# Patient Record
Sex: FEMALE | Race: WHITE | Employment: FULL TIME | URBAN - NONMETROPOLITAN AREA
[De-identification: names, ages, dates, MRNs, and addresses within clinical notes are randomized per-mention and may not be internally consistent; named-entity substitution may affect disease eponyms.]

---

## 2018-12-26 ENCOUNTER — HOSPITAL ENCOUNTER (EMERGENCY)
Age: 23
Discharge: HOME | End: 2018-12-26
Payer: COMMERCIAL

## 2018-12-26 ENCOUNTER — APPOINTMENT (OUTPATIENT)
Dept: GENERAL RADIOLOGY | Age: 23
End: 2018-12-26
Payer: COMMERCIAL

## 2018-12-26 VITALS
WEIGHT: 110 LBS | RESPIRATION RATE: 16 BRPM | SYSTOLIC BLOOD PRESSURE: 144 MMHG | OXYGEN SATURATION: 100 % | TEMPERATURE: 97.4 F | HEART RATE: 99 BPM | DIASTOLIC BLOOD PRESSURE: 96 MMHG

## 2018-12-26 DIAGNOSIS — S60.021A CONTUSION OF RIGHT INDEX FINGER WITHOUT DAMAGE TO NAIL, INITIAL ENCOUNTER: Primary | ICD-10-CM

## 2018-12-26 PROCEDURE — 99283 EMERGENCY DEPT VISIT LOW MDM: CPT

## 2018-12-26 PROCEDURE — 73120 X-RAY EXAM OF HAND: CPT

## 2018-12-26 ASSESSMENT — ENCOUNTER SYMPTOMS
BLOOD IN STOOL: 0
COUGH: 0
NAUSEA: 0
DIARRHEA: 0
SORE THROAT: 0
CHEST TIGHTNESS: 0
EYE REDNESS: 0
SHORTNESS OF BREATH: 0
ABDOMINAL PAIN: 0
CONSTIPATION: 0
RHINORRHEA: 0
VOMITING: 0
EYE DISCHARGE: 0
WHEEZING: 0
BACK PAIN: 0

## 2018-12-26 ASSESSMENT — PAIN SCALES - GENERAL: PAINLEVEL_OUTOF10: 5

## 2018-12-26 ASSESSMENT — PAIN DESCRIPTION - LOCATION: LOCATION: FINGER (COMMENT WHICH ONE)

## 2018-12-26 ASSESSMENT — PAIN DESCRIPTION - PAIN TYPE: TYPE: ACUTE PAIN

## 2018-12-26 ASSESSMENT — PAIN DESCRIPTION - ORIENTATION: ORIENTATION: RIGHT

## 2018-12-27 NOTE — ED PROVIDER NOTES
Negative for dizziness, syncope, weakness and light-headedness. Hematological: Negative for adenopathy. Does not bruise/bleed easily. Psychiatric/Behavioral: Negative for behavioral problems and suicidal ideas. The patient is not nervous/anxious. Except as noted above the remainder of the review of systems was reviewed and negative. PAST MEDICAL HISTORY   History reviewed. No pertinent past medical history. SURGICALHISTORY     History reviewed. No pertinent surgical history.       CURRENT MEDICATIONS       Discharge Medication List as of 12/26/2018  7:02 PM      CONTINUE these medications which have NOT CHANGED    Details   sulfamethoxazole-trimethoprim (BACTRIM DS;SEPTRA DS) 800-160 MG per tablet Take 1 tablet by mouth 2 times daily      diphenhydrAMINE (BENADRYL) 25 MG capsule Take 25 mg by mouth every 6 hours as needed for Itching      ibuprofen (ADVIL;MOTRIN) 600 MG tablet Take 1 tablet by mouth every 6 hours as needed for Pain., Disp-30 tablet, R-0             ALLERGIES     Other    FAMILY HISTORY       Family History   Problem Relation Age of Onset    High Blood Pressure Maternal Grandmother     High Cholesterol Maternal Grandmother     Diabetes Maternal Grandfather     Stroke Maternal Grandfather     High Blood Pressure Paternal Grandmother     Cancer Paternal Grandfather           SOCIAL HISTORY       Social History     Social History    Marital status: Single     Spouse name: N/A    Number of children: N/A    Years of education: N/A     Social History Main Topics    Smoking status: Never Smoker    Smokeless tobacco: Never Used    Alcohol use No    Drug use: No    Sexual activity: Not Asked     Other Topics Concern    None     Social History Narrative    None       SCREENINGS      @FLOW([id number])@      PHYSICAL EXAM    (up to 7 for level 4, 8 or more for level 5)     ED Triage Vitals [12/26/18 1825]   BP Temp Temp Source Pulse Resp SpO2 Height Weight   (!) 144/96 97.4 °F

## 2019-01-23 ENCOUNTER — APPOINTMENT (OUTPATIENT)
Dept: CT IMAGING | Age: 24
End: 2019-01-23
Payer: COMMERCIAL

## 2019-01-23 ENCOUNTER — HOSPITAL ENCOUNTER (EMERGENCY)
Age: 24
Discharge: HOME | End: 2019-01-24
Attending: EMERGENCY MEDICINE
Payer: COMMERCIAL

## 2019-01-23 ENCOUNTER — APPOINTMENT (OUTPATIENT)
Dept: ULTRASOUND IMAGING | Age: 24
End: 2019-01-23
Payer: COMMERCIAL

## 2019-01-23 VITALS
HEIGHT: 64 IN | RESPIRATION RATE: 16 BRPM | TEMPERATURE: 100 F | WEIGHT: 114 LBS | HEART RATE: 88 BPM | BODY MASS INDEX: 19.46 KG/M2 | DIASTOLIC BLOOD PRESSURE: 69 MMHG | OXYGEN SATURATION: 99 % | SYSTOLIC BLOOD PRESSURE: 110 MMHG

## 2019-01-23 DIAGNOSIS — R50.81 FEVER IN OTHER DISEASES: ICD-10-CM

## 2019-01-23 DIAGNOSIS — N93.9 VAGINAL BLEEDING: Primary | ICD-10-CM

## 2019-01-23 LAB
-: ABNORMAL
ALBUMIN SERPL-MCNC: 4.4 G/DL (ref 3.5–5.2)
ALBUMIN/GLOBULIN RATIO: 1.5 (ref 1–2.5)
ALP BLD-CCNC: 76 U/L (ref 35–104)
ALT SERPL-CCNC: 5 U/L (ref 5–33)
AMORPHOUS: ABNORMAL
AMPHETAMINE SCREEN URINE: NEGATIVE
ANION GAP SERPL CALCULATED.3IONS-SCNC: 12 MMOL/L (ref 9–17)
AST SERPL-CCNC: 15 U/L
BACTERIA: ABNORMAL
BARBITURATE SCREEN URINE: NEGATIVE
BENZODIAZEPINE SCREEN, URINE: NEGATIVE
BILIRUB SERPL-MCNC: 0.43 MG/DL (ref 0.3–1.2)
BILIRUBIN URINE: NEGATIVE
BUN BLDV-MCNC: 12 MG/DL (ref 6–20)
BUN/CREAT BLD: 18 (ref 9–20)
BUPRENORPHINE URINE: NEGATIVE
CALCIUM SERPL-MCNC: 8.4 MG/DL (ref 8.6–10.4)
CANNABINOID SCREEN URINE: NEGATIVE
CASTS UA: ABNORMAL /LPF
CHLORIDE BLD-SCNC: 103 MMOL/L (ref 98–107)
CO2: 24 MMOL/L (ref 20–31)
COCAINE METABOLITE, URINE: NEGATIVE
COLOR: YELLOW
COMMENT UA: ABNORMAL
CREAT SERPL-MCNC: 0.68 MG/DL (ref 0.5–0.9)
CRYSTALS, UA: ABNORMAL /HPF
EPITHELIAL CELLS UA: ABNORMAL /HPF (ref 0–25)
GFR AFRICAN AMERICAN: >60 ML/MIN
GFR NON-AFRICAN AMERICAN: >60 ML/MIN
GFR SERPL CREATININE-BSD FRML MDRD: ABNORMAL ML/MIN/{1.73_M2}
GFR SERPL CREATININE-BSD FRML MDRD: ABNORMAL ML/MIN/{1.73_M2}
GLUCOSE BLD-MCNC: 107 MG/DL (ref 70–99)
GLUCOSE URINE: NEGATIVE
HCG QUANTITATIVE: <1 IU/L
HCG(URINE) PREGNANCY TEST: NEGATIVE
HCT VFR BLD CALC: 39.5 % (ref 36.3–47.1)
HEMOGLOBIN: 12.9 G/DL (ref 11.9–15.1)
KETONES, URINE: NEGATIVE
LACTIC ACID, WHOLE BLOOD: NORMAL MMOL/L (ref 0.7–2.1)
LACTIC ACID: 0.7 MMOL/L (ref 0.5–2.2)
LEUKOCYTE ESTERASE, URINE: NEGATIVE
MCH RBC QN AUTO: 29.3 PG (ref 25.2–33.5)
MCHC RBC AUTO-ENTMCNC: 32.7 G/DL (ref 28.4–34.8)
MCV RBC AUTO: 89.6 FL (ref 82.6–102.9)
MDMA URINE: NORMAL
METHADONE SCREEN, URINE: NEGATIVE
METHAMPHETAMINE, URINE: NEGATIVE
MUCUS: ABNORMAL
NITRITE, URINE: NEGATIVE
NRBC AUTOMATED: 0 PER 100 WBC
OPIATES, URINE: NEGATIVE
OTHER OBSERVATIONS UA: ABNORMAL
OXYCODONE SCREEN URINE: NEGATIVE
PDW BLD-RTO: 12.1 % (ref 11.8–14.4)
PH UA: 7 (ref 5–9)
PHENCYCLIDINE, URINE: NEGATIVE
PLATELET # BLD: 360 K/UL (ref 138–453)
PMV BLD AUTO: 9.8 FL (ref 8.1–13.5)
POTASSIUM SERPL-SCNC: 3.9 MMOL/L (ref 3.7–5.3)
PROPOXYPHENE, URINE: NEGATIVE
PROTEIN UA: NEGATIVE
RBC # BLD: 4.41 M/UL (ref 3.95–5.11)
RBC UA: ABNORMAL /HPF (ref 0–2)
RENAL EPITHELIAL, UA: ABNORMAL /HPF
SODIUM BLD-SCNC: 139 MMOL/L (ref 135–144)
SPECIFIC GRAVITY UA: 1.02 (ref 1.01–1.02)
TEST INFORMATION: NORMAL
TOTAL PROTEIN: 7.3 G/DL (ref 6.4–8.3)
TRICHOMONAS: ABNORMAL
TRICYCLIC ANTIDEPRESSANTS, UR: NEGATIVE
TURBIDITY: CLEAR
URINE HGB: ABNORMAL
UROBILINOGEN, URINE: NORMAL
WBC # BLD: 19.9 K/UL (ref 4.5–13.5)
WBC UA: ABNORMAL /HPF (ref 0–5)
YEAST: ABNORMAL

## 2019-01-23 PROCEDURE — 84702 CHORIONIC GONADOTROPIN TEST: CPT

## 2019-01-23 PROCEDURE — 36415 COLL VENOUS BLD VENIPUNCTURE: CPT

## 2019-01-23 PROCEDURE — 87591 N.GONORRHOEAE DNA AMP PROB: CPT

## 2019-01-23 PROCEDURE — 74177 CT ABD & PELVIS W/CONTRAST: CPT

## 2019-01-23 PROCEDURE — 80306 DRUG TEST PRSMV INSTRMNT: CPT

## 2019-01-23 PROCEDURE — 87040 BLOOD CULTURE FOR BACTERIA: CPT

## 2019-01-23 PROCEDURE — 99284 EMERGENCY DEPT VISIT MOD MDM: CPT

## 2019-01-23 PROCEDURE — 80053 COMPREHEN METABOLIC PANEL: CPT

## 2019-01-23 PROCEDURE — 87491 CHLMYD TRACH DNA AMP PROBE: CPT

## 2019-01-23 PROCEDURE — 87210 SMEAR WET MOUNT SALINE/INK: CPT

## 2019-01-23 PROCEDURE — 83605 ASSAY OF LACTIC ACID: CPT

## 2019-01-23 PROCEDURE — 6360000004 HC RX CONTRAST MEDICATION: Performed by: EMERGENCY MEDICINE

## 2019-01-23 PROCEDURE — 6370000000 HC RX 637 (ALT 250 FOR IP): Performed by: PHYSICIAN ASSISTANT

## 2019-01-23 PROCEDURE — 84703 CHORIONIC GONADOTROPIN ASSAY: CPT

## 2019-01-23 PROCEDURE — 81001 URINALYSIS AUTO W/SCOPE: CPT

## 2019-01-23 PROCEDURE — 76830 TRANSVAGINAL US NON-OB: CPT

## 2019-01-23 PROCEDURE — 2580000003 HC RX 258: Performed by: PHYSICIAN ASSISTANT

## 2019-01-23 PROCEDURE — 85027 COMPLETE CBC AUTOMATED: CPT

## 2019-01-23 RX ORDER — 0.9 % SODIUM CHLORIDE 0.9 %
1000 INTRAVENOUS SOLUTION INTRAVENOUS ONCE
Status: COMPLETED | OUTPATIENT
Start: 2019-01-23 | End: 2019-01-23

## 2019-01-23 RX ORDER — ACETAMINOPHEN 500 MG
1000 TABLET ORAL ONCE
Status: COMPLETED | OUTPATIENT
Start: 2019-01-23 | End: 2019-01-23

## 2019-01-23 RX ADMIN — IOPAMIDOL 75 ML: 755 INJECTION, SOLUTION INTRAVENOUS at 21:26

## 2019-01-23 RX ADMIN — SODIUM CHLORIDE 1000 ML: 9 INJECTION, SOLUTION INTRAVENOUS at 20:16

## 2019-01-23 RX ADMIN — ACETAMINOPHEN 1000 MG: 500 TABLET, FILM COATED ORAL at 20:16

## 2019-01-23 ASSESSMENT — PAIN DESCRIPTION - DESCRIPTORS: DESCRIPTORS: ACHING

## 2019-01-23 ASSESSMENT — PAIN SCALES - GENERAL
PAINLEVEL_OUTOF10: 7
PAINLEVEL_OUTOF10: 7

## 2019-01-23 ASSESSMENT — ENCOUNTER SYMPTOMS
NAUSEA: 1
RESPIRATORY NEGATIVE: 1
ABDOMINAL PAIN: 1

## 2019-01-23 ASSESSMENT — PAIN DESCRIPTION - PAIN TYPE: TYPE: ACUTE PAIN

## 2019-01-24 LAB
DIRECT EXAM: NORMAL
Lab: NORMAL
SPECIMEN DESCRIPTION: NORMAL
STATUS: NORMAL

## 2019-01-24 RX ORDER — MAGNESIUM CITRATE
150 SOLUTION, ORAL ORAL ONCE
Qty: 150 ML | Refills: 0 | Status: SHIPPED | OUTPATIENT
Start: 2019-01-24 | End: 2019-01-24

## 2019-01-25 LAB
C TRACH DNA GENITAL QL NAA+PROBE: ABNORMAL
N. GONORRHOEAE DNA: NEGATIVE

## 2019-01-28 LAB
CULTURE: NORMAL
CULTURE: NORMAL
Lab: NORMAL
Lab: NORMAL
SPECIMEN DESCRIPTION: NORMAL
SPECIMEN DESCRIPTION: NORMAL
STATUS: NORMAL
STATUS: NORMAL

## 2019-02-24 ENCOUNTER — APPOINTMENT (OUTPATIENT)
Dept: GENERAL RADIOLOGY | Age: 24
End: 2019-02-24
Payer: COMMERCIAL

## 2019-02-24 ENCOUNTER — HOSPITAL ENCOUNTER (EMERGENCY)
Age: 24
Discharge: HOME | End: 2019-02-24
Attending: EMERGENCY MEDICINE
Payer: COMMERCIAL

## 2019-02-24 VITALS
OXYGEN SATURATION: 100 % | RESPIRATION RATE: 16 BRPM | SYSTOLIC BLOOD PRESSURE: 141 MMHG | DIASTOLIC BLOOD PRESSURE: 110 MMHG | TEMPERATURE: 99.3 F | HEART RATE: 76 BPM

## 2019-02-24 DIAGNOSIS — S60.221A CONTUSION OF RIGHT HAND, INITIAL ENCOUNTER: Primary | ICD-10-CM

## 2019-02-24 PROCEDURE — 99283 EMERGENCY DEPT VISIT LOW MDM: CPT

## 2019-02-24 PROCEDURE — 73130 X-RAY EXAM OF HAND: CPT

## 2019-02-24 ASSESSMENT — ENCOUNTER SYMPTOMS
NAUSEA: 0
DIARRHEA: 0
BACK PAIN: 0
CHEST TIGHTNESS: 0
SORE THROAT: 0
BLOOD IN STOOL: 0
WHEEZING: 0
EYE DISCHARGE: 0
EYE REDNESS: 0
ABDOMINAL PAIN: 0
VOMITING: 0
SHORTNESS OF BREATH: 0
RHINORRHEA: 0
COUGH: 0
CONSTIPATION: 0

## 2019-02-24 ASSESSMENT — PAIN SCALES - GENERAL
PAINLEVEL_OUTOF10: 7
PAINLEVEL_OUTOF10: 7

## 2019-08-09 ENCOUNTER — HOSPITAL ENCOUNTER (EMERGENCY)
Age: 24
Discharge: HOME | End: 2019-08-09
Payer: COMMERCIAL

## 2019-08-09 VITALS
DIASTOLIC BLOOD PRESSURE: 106 MMHG | HEART RATE: 101 BPM | SYSTOLIC BLOOD PRESSURE: 160 MMHG | OXYGEN SATURATION: 97 % | TEMPERATURE: 98.2 F | RESPIRATION RATE: 16 BRPM

## 2019-08-09 DIAGNOSIS — R29.0 CARPOPEDAL SPASM: Primary | ICD-10-CM

## 2019-08-09 LAB
-: ABNORMAL
ABSOLUTE EOS #: 0.08 K/UL (ref 0–0.44)
ABSOLUTE IMMATURE GRANULOCYTE: 0.03 K/UL (ref 0–0.3)
ABSOLUTE LYMPH #: 2.94 K/UL (ref 1.1–3.7)
ABSOLUTE MONO #: 0.62 K/UL (ref 0.1–1.4)
AMORPHOUS: ABNORMAL
ANION GAP SERPL CALCULATED.3IONS-SCNC: 9 MMOL/L (ref 9–17)
BACTERIA: ABNORMAL
BASOPHILS # BLD: 1 % (ref 0–2)
BASOPHILS ABSOLUTE: 0.08 K/UL (ref 0–0.2)
BILIRUBIN URINE: NEGATIVE
BUN BLDV-MCNC: 12 MG/DL (ref 6–20)
BUN/CREAT BLD: 15 (ref 9–20)
CALCIUM SERPL-MCNC: 9.6 MG/DL (ref 8.6–10.4)
CASTS UA: ABNORMAL /LPF
CHLORIDE BLD-SCNC: 102 MMOL/L (ref 98–107)
CHP ED QC CHECK: YES
CO2: 28 MMOL/L (ref 20–31)
COLOR: YELLOW
COMMENT UA: ABNORMAL
CREAT SERPL-MCNC: 0.79 MG/DL (ref 0.5–0.9)
CRYSTALS, UA: ABNORMAL /HPF
DIFFERENTIAL TYPE: NORMAL
EOSINOPHILS RELATIVE PERCENT: 1 % (ref 1–4)
EPITHELIAL CELLS UA: ABNORMAL /HPF (ref 0–25)
GFR AFRICAN AMERICAN: >60 ML/MIN
GFR NON-AFRICAN AMERICAN: >60 ML/MIN
GFR SERPL CREATININE-BSD FRML MDRD: ABNORMAL ML/MIN/{1.73_M2}
GFR SERPL CREATININE-BSD FRML MDRD: ABNORMAL ML/MIN/{1.73_M2}
GLUCOSE BLD-MCNC: 100 MG/DL (ref 70–99)
GLUCOSE BLD-MCNC: 91 MG/DL
GLUCOSE BLD-MCNC: 91 MG/DL (ref 74–100)
GLUCOSE URINE: NEGATIVE
HCG(URINE) PREGNANCY TEST: NEGATIVE
HCT VFR BLD CALC: 40.8 % (ref 36.3–47.1)
HEMOGLOBIN: 13.1 G/DL (ref 11.9–15.1)
IMMATURE GRANULOCYTES: 0 %
KETONES, URINE: ABNORMAL
LEUKOCYTE ESTERASE, URINE: NEGATIVE
LYMPHOCYTES # BLD: 31 % (ref 25–45)
MCH RBC QN AUTO: 26.6 PG (ref 25.2–33.5)
MCHC RBC AUTO-ENTMCNC: 32.1 G/DL (ref 28.4–34.8)
MCV RBC AUTO: 82.9 FL (ref 82.6–102.9)
MONOCYTES # BLD: 7 % (ref 2–8)
MUCUS: ABNORMAL
NITRITE, URINE: NEGATIVE
NRBC AUTOMATED: 0 PER 100 WBC
OTHER OBSERVATIONS UA: ABNORMAL
PDW BLD-RTO: 13.4 % (ref 11.8–14.4)
PH UA: 6 (ref 5–9)
PLATELET # BLD: 354 K/UL (ref 138–453)
PLATELET ESTIMATE: NORMAL
PMV BLD AUTO: 9.8 FL (ref 8.1–13.5)
POTASSIUM SERPL-SCNC: 4 MMOL/L (ref 3.7–5.3)
PROTEIN UA: NEGATIVE
RBC # BLD: 4.92 M/UL (ref 3.95–5.11)
RBC # BLD: NORMAL 10*6/UL
RBC UA: ABNORMAL /HPF (ref 0–2)
RENAL EPITHELIAL, UA: ABNORMAL /HPF
SEG NEUTROPHILS: 60 % (ref 34–64)
SEGMENTED NEUTROPHILS ABSOLUTE COUNT: 5.63 K/UL (ref 1.5–8.1)
SODIUM BLD-SCNC: 139 MMOL/L (ref 135–144)
SPECIFIC GRAVITY UA: 1.02 (ref 1.01–1.02)
TRICHOMONAS: ABNORMAL
TURBIDITY: CLEAR
URINE HGB: NEGATIVE
UROBILINOGEN, URINE: NORMAL
WBC # BLD: 9.4 K/UL (ref 4.5–13.5)
WBC # BLD: NORMAL 10*3/UL
WBC UA: ABNORMAL /HPF (ref 0–5)
YEAST: ABNORMAL

## 2019-08-09 PROCEDURE — 81001 URINALYSIS AUTO W/SCOPE: CPT

## 2019-08-09 PROCEDURE — 80048 BASIC METABOLIC PNL TOTAL CA: CPT

## 2019-08-09 PROCEDURE — 85025 COMPLETE CBC W/AUTO DIFF WBC: CPT

## 2019-08-09 PROCEDURE — 82947 ASSAY GLUCOSE BLOOD QUANT: CPT

## 2019-08-09 PROCEDURE — 36415 COLL VENOUS BLD VENIPUNCTURE: CPT

## 2019-08-09 PROCEDURE — 81025 URINE PREGNANCY TEST: CPT

## 2019-08-09 PROCEDURE — 99283 EMERGENCY DEPT VISIT LOW MDM: CPT

## 2019-08-09 NOTE — ED PROVIDER NOTES
(None if blank)  None    Procedures: (None if blank)       CLINICAL       1. Carpopedal spasm          DISPOSITION/PLAN   DISPOSITION Decision To Discharge 08/09/2019 06:40:26 PM      PATIENT [facility]ED [facility]:  [phone]    In 3 days        DISCHARGE MEDICATIONS:  There are no discharge medications for this patient.              (Please note that portions of this note were completed with a voice recognition program.  Efforts were made to edit the dictations but occasionallywords are mis-transcribed.)      Jamie Hammond II, PA-C (electronically signed)           Jamie Hammond II, PA-C  08/09/19 2002

## 2019-11-14 ENCOUNTER — HOSPITAL ENCOUNTER (EMERGENCY)
Age: 24
Discharge: HOME | End: 2019-11-14
Attending: EMERGENCY MEDICINE
Payer: COMMERCIAL

## 2019-11-14 VITALS
OXYGEN SATURATION: 99 % | RESPIRATION RATE: 19 BRPM | TEMPERATURE: 98.5 F | SYSTOLIC BLOOD PRESSURE: 141 MMHG | HEART RATE: 107 BPM | DIASTOLIC BLOOD PRESSURE: 87 MMHG
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
## 2019-11-14 DIAGNOSIS — S61.451A CAT BITE OF RIGHT HAND, INITIAL ENCOUNTER: Primary | ICD-10-CM

## 2019-11-14 PROCEDURE — 99282 EMERGENCY DEPT VISIT SF MDM: CPT

## 2019-11-14 PROCEDURE — 6370000000 HC RX 637 (ALT 250 FOR IP): Performed by: EMERGENCY MEDICINE

## 2019-11-14 RX ORDER — AMOXICILLIN AND CLAVULANATE POTASSIUM 875; 125 MG/1; MG/1
1 TABLET, FILM COATED ORAL 2 TIMES DAILY
Qty: 20 TABLET | Refills: 0 | Status: SHIPPED | OUTPATIENT
Start: 2019-11-14 | End: 2019-11-24

## 2019-11-14 RX ORDER — AMOXICILLIN AND CLAVULANATE POTASSIUM 875; 125 MG/1; MG/1
1 TABLET, FILM COATED ORAL ONCE
Status: COMPLETED | OUTPATIENT
Start: 2019-11-14 | End: 2019-11-14

## 2019-11-14 RX ADMIN — AMOXICILLIN AND CLAVULANATE POTASSIUM 1 TABLET: 875; 125 TABLET, FILM COATED ORAL at 01:34

## 2019-11-14 ASSESSMENT — PAIN DESCRIPTION - PAIN TYPE: TYPE: ACUTE PAIN

## 2019-11-14 ASSESSMENT — PAIN DESCRIPTION - LOCATION: LOCATION: HAND

## 2019-11-14 ASSESSMENT — PAIN DESCRIPTION - DESCRIPTORS: DESCRIPTORS: THROBBING

## 2019-11-14 ASSESSMENT — PAIN SCALES - GENERAL: PAINLEVEL_OUTOF10: 7

## 2019-11-14 ASSESSMENT — PAIN DESCRIPTION - ORIENTATION: ORIENTATION: RIGHT

## 2019-12-09 ENCOUNTER — HOSPITAL ENCOUNTER (OUTPATIENT)
Age: 24
Setting detail: SPECIMEN
Discharge: HOME | End: 2019-12-09
Payer: COMMERCIAL

## 2019-12-09 ENCOUNTER — INITIAL PRENATAL (OUTPATIENT)
Dept: OBGYN | Age: 24
End: 2019-12-09

## 2019-12-09 VITALS
WEIGHT: 108.2 LBS | SYSTOLIC BLOOD PRESSURE: 102 MMHG | HEART RATE: 72 BPM | BODY MASS INDEX: 18.57 KG/M2 | DIASTOLIC BLOOD PRESSURE: 66 MMHG

## 2019-12-09 DIAGNOSIS — N91.2 AMENORRHEA: Primary | ICD-10-CM

## 2019-12-09 DIAGNOSIS — N91.2 AMENORRHEA: ICD-10-CM

## 2019-12-09 DIAGNOSIS — Z32.01 POSITIVE URINE PREGNANCY TEST: ICD-10-CM

## 2019-12-09 DIAGNOSIS — Z34.91 ENCOUNTER FOR SUPERVISION OF NORMAL PREGNANCY IN FIRST TRIMESTER, UNSPECIFIED GRAVIDITY: ICD-10-CM

## 2019-12-09 LAB
ABO/RH: NORMAL
ABSOLUTE EOS #: 0.08 K/UL (ref 0–0.44)
ABSOLUTE IMMATURE GRANULOCYTE: <0.03 K/UL (ref 0–0.3)
ABSOLUTE LYMPH #: 1.64 K/UL (ref 1.1–3.7)
ABSOLUTE MONO #: 0.6 K/UL (ref 0.1–1.2)
AMPHETAMINE SCREEN URINE: NEGATIVE
ANTIBODY SCREEN: NEGATIVE
BARBITURATE SCREEN URINE: NEGATIVE
BASOPHILS # BLD: 1 % (ref 0–2)
BASOPHILS ABSOLUTE: 0.04 K/UL (ref 0–0.2)
BENZODIAZEPINE SCREEN, URINE: NEGATIVE
BUPRENORPHINE URINE: NEGATIVE
CANNABINOID SCREEN URINE: NEGATIVE
COCAINE METABOLITE, URINE: NEGATIVE
CONTROL: PRESENT
DIFFERENTIAL TYPE: ABNORMAL
EOSINOPHILS RELATIVE PERCENT: 1 % (ref 1–4)
HCT VFR BLD CALC: 39.6 % (ref 36.3–47.1)
HEMOGLOBIN: 12.3 G/DL (ref 11.9–15.1)
HEPATITIS B SURFACE ANTIGEN: NONREACTIVE
HEPATITIS C ANTIBODY: NONREACTIVE
HIV AG/AB: NONREACTIVE
IMMATURE GRANULOCYTES: 0 %
LYMPHOCYTES # BLD: 23 % (ref 24–43)
MCH RBC QN AUTO: 26.3 PG (ref 25.2–33.5)
MCHC RBC AUTO-ENTMCNC: 31.1 G/DL (ref 28.4–34.8)
MCV RBC AUTO: 84.8 FL (ref 82.6–102.9)
MDMA URINE: NORMAL
METHADONE SCREEN, URINE: NEGATIVE
METHAMPHETAMINE, URINE: NEGATIVE
MONOCYTES # BLD: 8 % (ref 3–12)
NRBC AUTOMATED: 0 PER 100 WBC
OPIATES, URINE: NEGATIVE
OXYCODONE SCREEN URINE: NEGATIVE
PDW BLD-RTO: 16.1 % (ref 11.8–14.4)
PHENCYCLIDINE, URINE: NEGATIVE
PLATELET # BLD: 289 K/UL (ref 138–453)
PLATELET ESTIMATE: ABNORMAL
PMV BLD AUTO: 11.2 FL (ref 8.1–13.5)
PREGNANCY TEST URINE, POC: POSITIVE
PROPOXYPHENE, URINE: NEGATIVE
RBC # BLD: 4.67 M/UL (ref 3.95–5.11)
RBC # BLD: ABNORMAL 10*6/UL
RUBV IGG SER QL: 493.8 IU/ML
SEG NEUTROPHILS: 67 % (ref 36–65)
SEGMENTED NEUTROPHILS ABSOLUTE COUNT: 4.9 K/UL (ref 1.5–8.1)
T. PALLIDUM, IGG: NONREACTIVE
TEST INFORMATION: NORMAL
TRICYCLIC ANTIDEPRESSANTS, UR: NEGATIVE
WBC # BLD: 7.3 K/UL (ref 3.5–11.3)
WBC # BLD: ABNORMAL 10*3/UL

## 2019-12-09 PROCEDURE — 86403 PARTICLE AGGLUT ANTBDY SCRN: CPT

## 2019-12-09 PROCEDURE — 86900 BLOOD TYPING SEROLOGIC ABO: CPT

## 2019-12-09 PROCEDURE — 87086 URINE CULTURE/COLONY COUNT: CPT

## 2019-12-09 PROCEDURE — 0500F INITIAL PRENATAL CARE VISIT: CPT | Performed by: ADVANCED PRACTICE MIDWIFE

## 2019-12-09 PROCEDURE — 87591 N.GONORRHOEAE DNA AMP PROB: CPT

## 2019-12-09 PROCEDURE — 87491 CHLMYD TRACH DNA AMP PROBE: CPT

## 2019-12-09 PROCEDURE — 87389 HIV-1 AG W/HIV-1&-2 AB AG IA: CPT

## 2019-12-09 PROCEDURE — 86762 RUBELLA ANTIBODY: CPT

## 2019-12-09 PROCEDURE — 86780 TREPONEMA PALLIDUM: CPT

## 2019-12-09 PROCEDURE — 80306 DRUG TEST PRSMV INSTRMNT: CPT

## 2019-12-09 PROCEDURE — 86901 BLOOD TYPING SEROLOGIC RH(D): CPT

## 2019-12-09 PROCEDURE — 86803 HEPATITIS C AB TEST: CPT

## 2019-12-09 PROCEDURE — 87340 HEPATITIS B SURFACE AG IA: CPT

## 2019-12-09 PROCEDURE — 86850 RBC ANTIBODY SCREEN: CPT

## 2019-12-09 PROCEDURE — 85025 COMPLETE CBC W/AUTO DIFF WBC: CPT

## 2019-12-09 ASSESSMENT — PATIENT HEALTH QUESTIONNAIRE - PHQ9
SUM OF ALL RESPONSES TO PHQ9 QUESTIONS 1 & 2: 0
SUM OF ALL RESPONSES TO PHQ QUESTIONS 1-9: 0
SUM OF ALL RESPONSES TO PHQ QUESTIONS 1-9: 0
2. FEELING DOWN, DEPRESSED OR HOPELESS: 0
1. LITTLE INTEREST OR PLEASURE IN DOING THINGS: 0

## 2019-12-10 LAB
C. TRACHOMATIS DNA ,URINE: ABNORMAL
N. GONORRHOEAE DNA, URINE: NEGATIVE
SPECIMEN DESCRIPTION: ABNORMAL

## 2019-12-11 DIAGNOSIS — N39.0 URINARY TRACT INFECTION WITHOUT HEMATURIA, SITE UNSPECIFIED: ICD-10-CM

## 2019-12-11 DIAGNOSIS — A74.9 CHLAMYDIA: Primary | ICD-10-CM

## 2019-12-11 LAB
CULTURE: ABNORMAL
Lab: ABNORMAL
SPECIMEN DESCRIPTION: ABNORMAL

## 2019-12-11 RX ORDER — AZITHROMYCIN 500 MG/1
1000 TABLET, FILM COATED ORAL DAILY
Qty: 2 TABLET | Refills: 0 | Status: SHIPPED | OUTPATIENT
Start: 2019-12-11 | End: 2019-12-12

## 2019-12-11 RX ORDER — AMOXICILLIN 500 MG/1
500 CAPSULE ORAL 3 TIMES DAILY
Qty: 21 CAPSULE | Refills: 0 | OUTPATIENT
Start: 2019-12-11 | End: 2019-12-18

## 2019-12-18 ENCOUNTER — PROCEDURE VISIT (OUTPATIENT)
Dept: OBGYN | Age: 24
End: 2019-12-18

## 2019-12-18 DIAGNOSIS — O36.80X0 ENCOUNTER TO DETERMINE FETAL VIABILITY OF PREGNANCY, SINGLE OR UNSPECIFIED FETUS: ICD-10-CM

## 2019-12-18 DIAGNOSIS — Z3A.08 8 WEEKS GESTATION OF PREGNANCY: ICD-10-CM

## 2020-01-13 ENCOUNTER — ROUTINE PRENATAL (OUTPATIENT)
Dept: OBGYN | Age: 25
End: 2020-01-13
Payer: COMMERCIAL

## 2020-01-13 ENCOUNTER — HOSPITAL ENCOUNTER (OUTPATIENT)
Age: 25
Setting detail: SPECIMEN
Discharge: HOME | End: 2020-01-13
Payer: COMMERCIAL

## 2020-01-13 VITALS — DIASTOLIC BLOOD PRESSURE: 64 MMHG | SYSTOLIC BLOOD PRESSURE: 112 MMHG | WEIGHT: 112 LBS | BODY MASS INDEX: 19.22 KG/M2

## 2020-01-13 PROCEDURE — G0145 SCR C/V CYTO,THINLAYER,RESCR: HCPCS

## 2020-01-13 PROCEDURE — 0502F SUBSEQUENT PRENATAL CARE: CPT | Performed by: ADVANCED PRACTICE MIDWIFE

## 2020-01-13 NOTE — PROGRESS NOTES
Freida Nelson is here at 12w1d for:    Chief Complaint   Patient presents with    Routine Prenatal Visit     TBE. Patient declies genetic testing. Unable to obtain urine. Estimated Due Date: Estimated Date of Delivery: 20    OB History    Para Term  AB Living   2 1 1     1   SAB TAB Ectopic Molar Multiple Live Births             1      # Outcome Date GA Lbr Tarik/2nd Weight Sex Delivery Anes PTL Lv   2 Current            1 Term 10/04/16   6 lb 4 oz (2.835 kg) M CS-Unspec Spinal  MARIA LUISA      Complications: Breech birth, fetus 1        No past medical history on file. Past Surgical History:   Procedure Laterality Date     SECTION         Social History     Tobacco Use   Smoking Status Never Smoker   Smokeless Tobacco Never Used        Social History     Substance and Sexual Activity   Alcohol Use No       No results found for this visit on 20. Vitals:  /64   Wt 112 lb (50.8 kg)   LMP 10/20/2019   BMI 19.22 kg/m²   Estimated body mass index is 19.22 kg/m² as calculated from the following:    Height as of 19: 5' 4\" (1.626 m). Weight as of this encounter: 112 lb (50.8 kg). HPI: here for routine ob visit, and initial ob exam     PT denies fever, chills, nausea and vomiting       Abdomen: enlarged, flat, soft, nontender  Total body exam completed please see prenatal physical exam tab in visit navigator        Results reviewed today:    No results found for this visit on 20. See prenatal vital sign section and fetal assessment section    ASSESSMENT & Plan    Diagnosis Orders   1. Encounter for supervision of other normal pregnancy in first trimester  PAP SMEAR   2. 12 weeks gestation of pregnancy               Rekha Jackson does not currently have medications on file. Return in about 4 weeks (around 2/10/2020) for ob. There are no Patient Instructions on file for this visit.           Jenae Rebolledo,2020 4:03 PM

## 2020-01-22 LAB — CYTOLOGY REPORT: NORMAL

## 2020-02-10 ENCOUNTER — ROUTINE PRENATAL (OUTPATIENT)
Dept: OBGYN | Age: 25
End: 2020-02-10
Payer: COMMERCIAL

## 2020-02-10 VITALS — WEIGHT: 118 LBS | DIASTOLIC BLOOD PRESSURE: 70 MMHG | BODY MASS INDEX: 20.25 KG/M2 | SYSTOLIC BLOOD PRESSURE: 110 MMHG

## 2020-02-10 PROCEDURE — 0502F SUBSEQUENT PRENATAL CARE: CPT | Performed by: ADVANCED PRACTICE MIDWIFE

## 2020-02-10 NOTE — PROGRESS NOTES
Ashvin Miranda is here at 16w1d for:    Chief Complaint   Patient presents with    Routine Prenatal Visit     Feeling good, voices no concerns. Estimated Due Date: Estimated Date of Delivery: 20    OB History    Para Term  AB Living   2 1 1     1   SAB TAB Ectopic Molar Multiple Live Births             1      # Outcome Date GA Lbr Tarik/2nd Weight Sex Delivery Anes PTL Lv   2 Current            1 Term 10/04/16   6 lb 4 oz (2.835 kg) M CS-Unspec Spinal  MARIA LUISA      Complications: Breech birth, fetus 1        No past medical history on file. Past Surgical History:   Procedure Laterality Date     SECTION         Social History     Tobacco Use   Smoking Status Never Smoker   Smokeless Tobacco Never Used        Social History     Substance and Sexual Activity   Alcohol Use No       No results found for this visit on 02/10/20. Vitals:  /70   Wt 118 lb (53.5 kg)   LMP 10/20/2019   BMI 20.25 kg/m²   Estimated body mass index is 20.25 kg/m² as calculated from the following:    Height as of 19: 5' 4\" (1.626 m). Weight as of this encounter: 118 lb (53.5 kg). HPI: here for routine ob visit, denies c/o     PT denies fever, chills, nausea and vomiting       Abdomen: enlarged, gravid, soft, nontender     Results reviewed today:    No results found for this visit on 02/10/20. See prenatal vital sign section and fetal assessment section    ASSESSMENT & Plan    Diagnosis Orders   1. Encounter for supervision of other normal pregnancy in second trimester     2. 16 weeks gestation of pregnancy               Rekha Redd Darylabhijeet does not currently have medications on file. Return in about 4 weeks (around 3/9/2020) for ob 20wkus. There are no Patient Instructions on file for this visit.           Neha Rebolledo,2/10/2020 2:37 PM

## 2020-03-09 ENCOUNTER — ROUTINE PRENATAL (OUTPATIENT)
Dept: OBGYN | Age: 25
End: 2020-03-09
Payer: COMMERCIAL

## 2020-03-09 VITALS — SYSTOLIC BLOOD PRESSURE: 114 MMHG | DIASTOLIC BLOOD PRESSURE: 70 MMHG | BODY MASS INDEX: 20.08 KG/M2 | WEIGHT: 117 LBS

## 2020-03-09 PROCEDURE — 0502F SUBSEQUENT PRENATAL CARE: CPT | Performed by: ADVANCED PRACTICE MIDWIFE

## 2020-04-06 ENCOUNTER — TELEMEDICINE (OUTPATIENT)
Dept: OBGYN | Age: 25
End: 2020-04-06
Payer: COMMERCIAL

## 2020-04-06 VITALS — HEIGHT: 64 IN | WEIGHT: 120 LBS | BODY MASS INDEX: 20.49 KG/M2

## 2020-04-06 PROCEDURE — 0502F SUBSEQUENT PRENATAL CARE: CPT | Performed by: ADVANCED PRACTICE MIDWIFE

## 2020-04-06 ASSESSMENT — ENCOUNTER SYMPTOMS
SORE THROAT: 0
SHORTNESS OF BREATH: 0

## 2020-05-04 ENCOUNTER — ROUTINE PRENATAL (OUTPATIENT)
Dept: OBGYN | Age: 25
End: 2020-05-04
Payer: COMMERCIAL

## 2020-05-04 VITALS — SYSTOLIC BLOOD PRESSURE: 130 MMHG | BODY MASS INDEX: 20.68 KG/M2 | DIASTOLIC BLOOD PRESSURE: 78 MMHG | WEIGHT: 120.5 LBS

## 2020-05-04 LAB
CHP ED QC CHECK: ABNORMAL
GLUCOSE BLD-MCNC: 163 MG/DL
HGB, POC: 12.3

## 2020-05-04 PROCEDURE — 85018 HEMOGLOBIN: CPT | Performed by: ADVANCED PRACTICE MIDWIFE

## 2020-05-04 PROCEDURE — 82962 GLUCOSE BLOOD TEST: CPT | Performed by: ADVANCED PRACTICE MIDWIFE

## 2020-05-04 PROCEDURE — 0502F SUBSEQUENT PRENATAL CARE: CPT | Performed by: ADVANCED PRACTICE MIDWIFE

## 2020-05-04 NOTE — PROGRESS NOTES
Kitty Lares is here at 28w1d for:    Chief Complaint   Patient presents with    Routine Prenatal Visit     1 hour gtt. C/O off and on seeing \"black dots\",no headaches or edema. Estimated Due Date: Estimated Date of Delivery: 20    OB History    Para Term  AB Living   2 1 1     1   SAB TAB Ectopic Molar Multiple Live Births             1      # Outcome Date GA Lbr Tarik/2nd Weight Sex Delivery Anes PTL Lv   2 Current            1 Term 10/04/16   6 lb 4 oz (2.835 kg) M CS-Unspec Spinal  MARIA LUISA      Complications: Breech birth, fetus 1        No past medical history on file. Past Surgical History:   Procedure Laterality Date     SECTION         Social History     Tobacco Use   Smoking Status Never Smoker   Smokeless Tobacco Never Used        Social History     Substance and Sexual Activity   Alcohol Use No       No results found for this visit on 20. Vitals:  /78   Wt 120 lb 8 oz (54.7 kg)   LMP 10/20/2019   BMI 20.68 kg/m²   Estimated body mass index is 20.68 kg/m² as calculated from the following:    Height as of 20: 5' 4\" (1.626 m). Weight as of this encounter: 120 lb 8 oz (54.7 kg). HPI: here for routine ob visit and gtt, continues to desire delivery at [facility] [place], [facility] by [facility] rather than [facility], regardless of route of delivery     PT denies fever, chills, nausea and vomiting       Abdomen: enlarged, gravid, soft, nontender     Results reviewed today:    No results found for this visit on 20. See prenatal vital sign section and fetal assessment section    ASSESSMENT & Plan    Diagnosis Orders   1. Encounter for supervision of other normal pregnancy in third trimester     2. 28 weeks gestation of pregnancy       Will f/u with Dr Gayland Primrose to discuss options at next appointment        Rekha Schwab does not currently have medications on file.     Return in about 2 weeks (around 2020) for ob 30wkUS+tdap and f/u with Dr. Gayland Primrose to discuss  and delivery in [place]. There are no Patient Instructions on file for this visit.           Liana Rebolledo,5/4/2020 2:46 PM

## 2020-05-06 ENCOUNTER — HOSPITAL ENCOUNTER (OUTPATIENT)
Dept: LAB | Age: 25
Discharge: HOME | End: 2020-05-06

## 2020-05-06 LAB
AMOUNT GLUCOSE GIVEN: 100 G
GLUCOSE FASTING: 94 MG/DL (ref 65–99)
GLUCOSE TOLERANCE TEST 1 HOUR: 99 MG/DL (ref 65–184)
GLUCOSE TOLERANCE TEST 2 HOUR: 80 MG/DL (ref 65–139)
GLUCOSE TOLERANCE TEST 3 HOUR: 73 MG/DL (ref 65–130)

## 2020-05-06 PROCEDURE — 36415 COLL VENOUS BLD VENIPUNCTURE: CPT

## 2020-05-06 PROCEDURE — 82952 GTT-ADDED SAMPLES: CPT

## 2020-05-06 PROCEDURE — 82951 GLUCOSE TOLERANCE TEST (GTT): CPT

## 2020-05-19 ENCOUNTER — ANCILLARY PROCEDURE (OUTPATIENT)
Dept: OBGYN | Age: 25
End: 2020-05-19
Payer: MEDICAID

## 2020-05-19 PROCEDURE — 76816 OB US FOLLOW-UP PER FETUS: CPT | Performed by: OBSTETRICS & GYNECOLOGY

## 2020-06-01 ENCOUNTER — TELEPHONE (OUTPATIENT)
Dept: OBGYN | Age: 25
End: 2020-06-01

## 2020-06-01 ENCOUNTER — HOSPITAL ENCOUNTER (OUTPATIENT)
Age: 25
Discharge: HOME | End: 2020-06-01
Attending: ADVANCED PRACTICE MIDWIFE | Admitting: ADVANCED PRACTICE MIDWIFE

## 2020-06-01 ENCOUNTER — APPOINTMENT (OUTPATIENT)
Dept: ULTRASOUND IMAGING | Age: 25
End: 2020-06-01

## 2020-06-01 VITALS
TEMPERATURE: 97.6 F | HEIGHT: 64 IN | DIASTOLIC BLOOD PRESSURE: 81 MMHG | RESPIRATION RATE: 16 BRPM | SYSTOLIC BLOOD PRESSURE: 131 MMHG | BODY MASS INDEX: 21.51 KG/M2 | HEART RATE: 96 BPM | WEIGHT: 126 LBS

## 2020-06-01 PROBLEM — R10.9 ABDOMINAL CRAMPS: Status: ACTIVE | Noted: 2020-06-01

## 2020-06-01 LAB
BILIRUBIN URINE: NEGATIVE
COLOR: YELLOW
COMMENT UA: ABNORMAL
GLUCOSE URINE: NEGATIVE
KETONES, URINE: NEGATIVE
LEUKOCYTE ESTERASE, URINE: NEGATIVE
NITRITE, URINE: NEGATIVE
PH UA: 6 (ref 5–9)
PROTEIN UA: NEGATIVE
SPECIFIC GRAVITY UA: 1.02 (ref 1.01–1.02)
TURBIDITY: CLEAR
URINE HGB: NEGATIVE
UROBILINOGEN, URINE: NORMAL

## 2020-06-01 PROCEDURE — 76818 FETAL BIOPHYS PROFILE W/NST: CPT

## 2020-06-01 PROCEDURE — 99215 OFFICE O/P EST HI 40 MIN: CPT

## 2020-06-01 PROCEDURE — 81003 URINALYSIS AUTO W/O SCOPE: CPT

## 2020-06-01 PROCEDURE — 59025 FETAL NON-STRESS TEST: CPT

## 2020-06-01 NOTE — FLOWSHEET NOTE
Ash Benavides cnm called report about SVE and urine results and reactive NST.  She views fetal tracing for the 1455 period orders obtained

## 2020-06-01 NOTE — PROGRESS NOTES
Multigravida, 32 weeks in for \"stomach got hard twice today\"  NST reactive, cervix without change but after nst completed, patient had one contraction on monitor with early decel.   Will monitor further and order bpp

## 2020-06-02 ENCOUNTER — ROUTINE PRENATAL (OUTPATIENT)
Dept: OBGYN | Age: 25
End: 2020-06-02
Payer: MEDICAID

## 2020-06-02 ENCOUNTER — ANCILLARY PROCEDURE (OUTPATIENT)
Dept: OBGYN | Age: 25
End: 2020-06-02
Payer: MEDICAID

## 2020-06-02 VITALS — DIASTOLIC BLOOD PRESSURE: 80 MMHG | SYSTOLIC BLOOD PRESSURE: 128 MMHG | WEIGHT: 125 LBS | BODY MASS INDEX: 21.46 KG/M2

## 2020-06-02 PROCEDURE — 99211 OFF/OP EST MAY X REQ PHY/QHP: CPT | Performed by: OBSTETRICS & GYNECOLOGY

## 2020-06-02 PROCEDURE — 76815 OB US LIMITED FETUS(S): CPT | Performed by: OBSTETRICS & GYNECOLOGY

## 2020-06-02 PROCEDURE — 99213 OFFICE O/P EST LOW 20 MIN: CPT | Performed by: OBSTETRICS & GYNECOLOGY

## 2020-06-15 ENCOUNTER — ANCILLARY PROCEDURE (OUTPATIENT)
Dept: OBGYN | Age: 25
End: 2020-06-15
Payer: MEDICAID

## 2020-06-15 PROCEDURE — 76815 OB US LIMITED FETUS(S): CPT | Performed by: OBSTETRICS & GYNECOLOGY

## 2020-06-16 ENCOUNTER — ROUTINE PRENATAL (OUTPATIENT)
Dept: OBGYN | Age: 25
End: 2020-06-16
Payer: MEDICAID

## 2020-06-16 VITALS — WEIGHT: 132 LBS | SYSTOLIC BLOOD PRESSURE: 124 MMHG | BODY MASS INDEX: 22.66 KG/M2 | DIASTOLIC BLOOD PRESSURE: 84 MMHG

## 2020-06-16 PROCEDURE — 99213 OFFICE O/P EST LOW 20 MIN: CPT | Performed by: OBSTETRICS & GYNECOLOGY

## 2020-06-16 PROCEDURE — 99211 OFF/OP EST MAY X REQ PHY/QHP: CPT

## 2020-06-29 ENCOUNTER — HOSPITAL ENCOUNTER (OUTPATIENT)
Age: 25
Setting detail: SPECIMEN
Discharge: HOME | End: 2020-06-29
Payer: MEDICAID

## 2020-06-29 ENCOUNTER — INITIAL PRENATAL (OUTPATIENT)
Dept: OBGYN CLINIC | Age: 25
End: 2020-06-29
Payer: MEDICAID

## 2020-06-29 VITALS
DIASTOLIC BLOOD PRESSURE: 99 MMHG | WEIGHT: 123 LBS | HEART RATE: 87 BPM | BODY MASS INDEX: 21.11 KG/M2 | SYSTOLIC BLOOD PRESSURE: 148 MMHG

## 2020-06-29 PROBLEM — O09.93 SUPERVISION OF HIGH RISK PREGNANCY IN THIRD TRIMESTER: Status: ACTIVE | Noted: 2020-06-29

## 2020-06-29 PROBLEM — R82.71 GBS BACTERIURIA: Status: ACTIVE | Noted: 2020-06-29

## 2020-06-29 PROBLEM — O34.219 HISTORY OF CESAREAN DELIVERY AFFECTING PREGNANCY: Status: ACTIVE | Noted: 2020-06-29

## 2020-06-29 LAB
ABSOLUTE EOS #: 0.05 K/UL (ref 0–0.44)
ABSOLUTE IMMATURE GRANULOCYTE: 0.05 K/UL (ref 0–0.3)
ABSOLUTE LYMPH #: 1.8 K/UL (ref 1.1–3.7)
ABSOLUTE MONO #: 0.47 K/UL (ref 0.1–1.2)
ALBUMIN SERPL-MCNC: 3.6 G/DL (ref 3.5–5.2)
ALBUMIN/GLOBULIN RATIO: 1.2 (ref 1–2.5)
ALP BLD-CCNC: 205 U/L (ref 35–104)
ALT SERPL-CCNC: 9 U/L (ref 5–33)
ANION GAP SERPL CALCULATED.3IONS-SCNC: 15 MMOL/L (ref 9–17)
AST SERPL-CCNC: 17 U/L
BASOPHILS # BLD: 1 % (ref 0–2)
BASOPHILS ABSOLUTE: 0.04 K/UL (ref 0–0.2)
BILIRUB SERPL-MCNC: 0.28 MG/DL (ref 0.3–1.2)
BUN BLDV-MCNC: 8 MG/DL (ref 6–20)
BUN/CREAT BLD: ABNORMAL (ref 9–20)
CALCIUM SERPL-MCNC: 8.7 MG/DL (ref 8.6–10.4)
CHLORIDE BLD-SCNC: 103 MMOL/L (ref 98–107)
CO2: 21 MMOL/L (ref 20–31)
CREAT SERPL-MCNC: 0.67 MG/DL (ref 0.5–0.9)
CREATININE URINE: 105.7 MG/DL (ref 28–217)
DIFFERENTIAL TYPE: ABNORMAL
EOSINOPHILS RELATIVE PERCENT: 1 % (ref 1–4)
GFR AFRICAN AMERICAN: >60 ML/MIN
GFR NON-AFRICAN AMERICAN: >60 ML/MIN
GFR SERPL CREATININE-BSD FRML MDRD: ABNORMAL ML/MIN/{1.73_M2}
GFR SERPL CREATININE-BSD FRML MDRD: ABNORMAL ML/MIN/{1.73_M2}
GLUCOSE BLD-MCNC: 92 MG/DL (ref 70–99)
HCT VFR BLD CALC: 39.2 % (ref 36.3–47.1)
HEMOGLOBIN: 12 G/DL (ref 11.9–15.1)
IMMATURE GRANULOCYTES: 1 %
LYMPHOCYTES # BLD: 23 % (ref 24–43)
MCH RBC QN AUTO: 26.2 PG (ref 25.2–33.5)
MCHC RBC AUTO-ENTMCNC: 30.6 G/DL (ref 28.4–34.8)
MCV RBC AUTO: 85.6 FL (ref 82.6–102.9)
MONOCYTES # BLD: 6 % (ref 3–12)
NRBC AUTOMATED: 0 PER 100 WBC
PDW BLD-RTO: 13.2 % (ref 11.8–14.4)
PLATELET # BLD: 199 K/UL (ref 138–453)
PLATELET ESTIMATE: ABNORMAL
PMV BLD AUTO: 12.5 FL (ref 8.1–13.5)
POTASSIUM SERPL-SCNC: 3.8 MMOL/L (ref 3.7–5.3)
RBC # BLD: 4.58 M/UL (ref 3.95–5.11)
RBC # BLD: ABNORMAL 10*6/UL
SEG NEUTROPHILS: 68 % (ref 36–65)
SEGMENTED NEUTROPHILS ABSOLUTE COUNT: 5.52 K/UL (ref 1.5–8.1)
SODIUM BLD-SCNC: 139 MMOL/L (ref 135–144)
TOTAL PROTEIN, URINE: 13 MG/DL
TOTAL PROTEIN: 6.6 G/DL (ref 6.4–8.3)
URINE TOTAL PROTEIN CREATININE RATIO: 0.12 (ref 0–0.2)
WBC # BLD: 7.9 K/UL (ref 3.5–11.3)
WBC # BLD: ABNORMAL 10*3/UL

## 2020-06-29 PROCEDURE — 99214 OFFICE O/P EST MOD 30 MIN: CPT | Performed by: OBSTETRICS & GYNECOLOGY

## 2020-07-06 ENCOUNTER — HOSPITAL ENCOUNTER (INPATIENT)
Age: 25
LOS: 3 days | Discharge: HOME | End: 2020-07-09
Attending: OBSTETRICS & GYNECOLOGY | Admitting: OBSTETRICS & GYNECOLOGY

## 2020-07-06 ENCOUNTER — ROUTINE PRENATAL (OUTPATIENT)
Dept: OBGYN CLINIC | Age: 25
End: 2020-07-06
Payer: MEDICAID

## 2020-07-06 ENCOUNTER — APPOINTMENT (OUTPATIENT)
Dept: LABOR AND DELIVERY | Age: 25
End: 2020-07-06

## 2020-07-06 VITALS
WEIGHT: 121 LBS | SYSTOLIC BLOOD PRESSURE: 149 MMHG | DIASTOLIC BLOOD PRESSURE: 104 MMHG | HEART RATE: 84 BPM | BODY MASS INDEX: 20.77 KG/M2

## 2020-07-06 PROBLEM — A74.9 CHLAMYDIA INFECTION: Status: ACTIVE | Noted: 2020-07-06

## 2020-07-06 PROBLEM — O13.9 GESTATIONAL HYPERTENSION: Status: ACTIVE | Noted: 2020-07-06

## 2020-07-06 PROBLEM — O09.93 HIGH-RISK PREGNANCY, THIRD TRIMESTER: Status: ACTIVE | Noted: 2020-07-06

## 2020-07-06 LAB
ABO/RH: NORMAL
ABSOLUTE EOS #: 0.05 K/UL (ref 0–0.44)
ABSOLUTE IMMATURE GRANULOCYTE: 0.04 K/UL (ref 0–0.3)
ABSOLUTE LYMPH #: 2.59 K/UL (ref 1.1–3.7)
ABSOLUTE MONO #: 0.56 K/UL (ref 0.1–1.2)
ALBUMIN SERPL-MCNC: 4 G/DL (ref 3.5–5.2)
ALBUMIN/GLOBULIN RATIO: 1.3 (ref 1–2.5)
ALP BLD-CCNC: 251 U/L (ref 35–104)
ALT SERPL-CCNC: 8 U/L (ref 5–33)
AMPHETAMINE SCREEN URINE: NEGATIVE
ANION GAP SERPL CALCULATED.3IONS-SCNC: 13 MMOL/L (ref 9–17)
ANTIBODY SCREEN: NEGATIVE
ARM BAND NUMBER: NORMAL
AST SERPL-CCNC: 19 U/L
BARBITURATE SCREEN URINE: NEGATIVE
BASOPHILS # BLD: 1 % (ref 0–2)
BASOPHILS ABSOLUTE: 0.07 K/UL (ref 0–0.2)
BENZODIAZEPINE SCREEN, URINE: NEGATIVE
BILIRUB SERPL-MCNC: 0.36 MG/DL (ref 0.3–1.2)
BUN BLDV-MCNC: 11 MG/DL (ref 6–20)
BUN/CREAT BLD: ABNORMAL (ref 9–20)
BUPRENORPHINE URINE: NORMAL
CALCIUM SERPL-MCNC: 9.3 MG/DL (ref 8.6–10.4)
CANNABINOID SCREEN URINE: NEGATIVE
CHLORIDE BLD-SCNC: 101 MMOL/L (ref 98–107)
CO2: 22 MMOL/L (ref 20–31)
COCAINE METABOLITE, URINE: NEGATIVE
CREAT SERPL-MCNC: 0.67 MG/DL (ref 0.5–0.9)
CREATININE URINE: 178.5 MG/DL (ref 28–217)
DIFFERENTIAL TYPE: ABNORMAL
EOSINOPHILS RELATIVE PERCENT: 1 % (ref 1–4)
EXPIRATION DATE: NORMAL
GFR AFRICAN AMERICAN: >60 ML/MIN
GFR NON-AFRICAN AMERICAN: >60 ML/MIN
GFR SERPL CREATININE-BSD FRML MDRD: ABNORMAL ML/MIN/{1.73_M2}
GFR SERPL CREATININE-BSD FRML MDRD: ABNORMAL ML/MIN/{1.73_M2}
GLUCOSE BLD-MCNC: 81 MG/DL (ref 70–99)
HCT VFR BLD CALC: 42 % (ref 36.3–47.1)
HEMOGLOBIN: 13.2 G/DL (ref 11.9–15.1)
IMMATURE GRANULOCYTES: 1 %
LYMPHOCYTES # BLD: 30 % (ref 24–43)
MCH RBC QN AUTO: 25.9 PG (ref 25.2–33.5)
MCHC RBC AUTO-ENTMCNC: 31.4 G/DL (ref 28.4–34.8)
MCV RBC AUTO: 82.4 FL (ref 82.6–102.9)
MDMA URINE: NORMAL
METHADONE SCREEN, URINE: NEGATIVE
METHAMPHETAMINE, URINE: NORMAL
MONOCYTES # BLD: 7 % (ref 3–12)
NRBC AUTOMATED: 0 PER 100 WBC
OPIATES, URINE: NEGATIVE
OXYCODONE SCREEN URINE: NEGATIVE
PDW BLD-RTO: 13.3 % (ref 11.8–14.4)
PHENCYCLIDINE, URINE: NEGATIVE
PLATELET # BLD: 229 K/UL (ref 138–453)
PLATELET ESTIMATE: ABNORMAL
PMV BLD AUTO: 11.9 FL (ref 8.1–13.5)
POTASSIUM SERPL-SCNC: 3.7 MMOL/L (ref 3.7–5.3)
PROPOXYPHENE, URINE: NORMAL
RBC # BLD: 5.1 M/UL (ref 3.95–5.11)
RBC # BLD: ABNORMAL 10*6/UL
SARS-COV-2, PCR: NORMAL
SARS-COV-2, RAPID: NOT DETECTED
SARS-COV-2: NORMAL
SEG NEUTROPHILS: 60 % (ref 36–65)
SEGMENTED NEUTROPHILS ABSOLUTE COUNT: 5.28 K/UL (ref 1.5–8.1)
SODIUM BLD-SCNC: 136 MMOL/L (ref 135–144)
SOURCE: NORMAL
T. PALLIDUM, IGG: NONREACTIVE
TEST INFORMATION: NORMAL
TOTAL PROTEIN, URINE: 19 MG/DL
TOTAL PROTEIN: 7.2 G/DL (ref 6.4–8.3)
TRICYCLIC ANTIDEPRESSANTS, UR: NORMAL
URINE TOTAL PROTEIN CREATININE RATIO: 0.11 (ref 0–0.2)
WBC # BLD: 8.6 K/UL (ref 3.5–11.3)
WBC # BLD: ABNORMAL 10*3/UL

## 2020-07-06 PROCEDURE — 86780 TREPONEMA PALLIDUM: CPT

## 2020-07-06 PROCEDURE — 96361 HYDRATE IV INFUSION ADD-ON: CPT

## 2020-07-06 PROCEDURE — 2580000003 HC RX 258: Performed by: STUDENT IN AN ORGANIZED HEALTH CARE EDUCATION/TRAINING PROGRAM

## 2020-07-06 PROCEDURE — 87591 N.GONORRHOEAE DNA AMP PROB: CPT

## 2020-07-06 PROCEDURE — 85025 COMPLETE CBC W/AUTO DIFF WBC: CPT

## 2020-07-06 PROCEDURE — 86850 RBC ANTIBODY SCREEN: CPT

## 2020-07-06 PROCEDURE — 1220000000 HC SEMI PRIVATE OB R&B

## 2020-07-06 PROCEDURE — U0002 COVID-19 LAB TEST NON-CDC: HCPCS

## 2020-07-06 PROCEDURE — 96365 THER/PROPH/DIAG IV INF INIT: CPT

## 2020-07-06 PROCEDURE — 86900 BLOOD TYPING SEROLOGIC ABO: CPT

## 2020-07-06 PROCEDURE — 99212 OFFICE O/P EST SF 10 MIN: CPT | Performed by: OBSTETRICS & GYNECOLOGY

## 2020-07-06 PROCEDURE — 84156 ASSAY OF PROTEIN URINE: CPT

## 2020-07-06 PROCEDURE — 82570 ASSAY OF URINE CREATININE: CPT

## 2020-07-06 PROCEDURE — 87491 CHLMYD TRACH DNA AMP PROBE: CPT

## 2020-07-06 PROCEDURE — 96360 HYDRATION IV INFUSION INIT: CPT

## 2020-07-06 PROCEDURE — 80053 COMPREHEN METABOLIC PANEL: CPT

## 2020-07-06 PROCEDURE — 86901 BLOOD TYPING SEROLOGIC RH(D): CPT

## 2020-07-06 PROCEDURE — 6360000002 HC RX W HCPCS

## 2020-07-06 PROCEDURE — 80307 DRUG TEST PRSMV CHEM ANLYZR: CPT

## 2020-07-06 PROCEDURE — 6360000002 HC RX W HCPCS: Performed by: STUDENT IN AN ORGANIZED HEALTH CARE EDUCATION/TRAINING PROGRAM

## 2020-07-06 RX ORDER — ACETAMINOPHEN 500 MG
1000 TABLET ORAL EVERY 6 HOURS PRN
Status: DISCONTINUED | OUTPATIENT
Start: 2020-07-06 | End: 2020-07-07

## 2020-07-06 RX ORDER — DOCUSATE SODIUM 100 MG/1
100 CAPSULE, LIQUID FILLED ORAL 2 TIMES DAILY
Status: DISCONTINUED | OUTPATIENT
Start: 2020-07-06 | End: 2020-07-07

## 2020-07-06 RX ORDER — SODIUM CHLORIDE 0.9 % (FLUSH) 0.9 %
10 SYRINGE (ML) INJECTION EVERY 12 HOURS SCHEDULED
Status: DISCONTINUED | OUTPATIENT
Start: 2020-07-06 | End: 2020-07-07

## 2020-07-06 RX ORDER — ONDANSETRON 2 MG/ML
4 INJECTION INTRAMUSCULAR; INTRAVENOUS EVERY 6 HOURS PRN
Status: DISCONTINUED | OUTPATIENT
Start: 2020-07-06 | End: 2020-07-07 | Stop reason: SDUPTHER

## 2020-07-06 RX ORDER — SODIUM CHLORIDE 0.9 % (FLUSH) 0.9 %
10 SYRINGE (ML) INJECTION PRN
Status: DISCONTINUED | OUTPATIENT
Start: 2020-07-06 | End: 2020-07-07

## 2020-07-06 RX ORDER — SODIUM CHLORIDE, SODIUM LACTATE, POTASSIUM CHLORIDE, CALCIUM CHLORIDE 600; 310; 30; 20 MG/100ML; MG/100ML; MG/100ML; MG/100ML
INJECTION, SOLUTION INTRAVENOUS CONTINUOUS
Status: DISCONTINUED | OUTPATIENT
Start: 2020-07-06 | End: 2020-07-07

## 2020-07-06 RX ADMIN — Medication 1 MILLI-UNITS/MIN: at 22:42

## 2020-07-06 RX ADMIN — SODIUM CHLORIDE, POTASSIUM CHLORIDE, SODIUM LACTATE AND CALCIUM CHLORIDE: 600; 310; 30; 20 INJECTION, SOLUTION INTRAVENOUS at 21:00

## 2020-07-06 RX ADMIN — DEXTROSE MONOHYDRATE 5 MILLION UNITS: 5 INJECTION INTRAVENOUS at 22:03

## 2020-07-07 ENCOUNTER — ANESTHESIA (OUTPATIENT)
Dept: LABOR AND DELIVERY | Age: 25
End: 2020-07-07

## 2020-07-07 ENCOUNTER — ANESTHESIA EVENT (OUTPATIENT)
Dept: LABOR AND DELIVERY | Age: 25
End: 2020-07-07

## 2020-07-07 PROBLEM — O34.219 VBAC (VAGINAL BIRTH AFTER CESAREAN): Status: ACTIVE | Noted: 2020-07-07

## 2020-07-07 PROCEDURE — 6360000002 HC RX W HCPCS: Performed by: ANESTHESIOLOGY

## 2020-07-07 PROCEDURE — 6360000002 HC RX W HCPCS: Performed by: STUDENT IN AN ORGANIZED HEALTH CARE EDUCATION/TRAINING PROGRAM

## 2020-07-07 PROCEDURE — 3E033VJ INTRODUCTION OF OTHER HORMONE INTO PERIPHERAL VEIN, PERCUTANEOUS APPROACH: ICD-10-PCS | Performed by: OBSTETRICS & GYNECOLOGY

## 2020-07-07 PROCEDURE — 3700000025 EPIDURAL BLOCK: Performed by: ANESTHESIOLOGY

## 2020-07-07 PROCEDURE — 1220000000 HC SEMI PRIVATE OB R&B

## 2020-07-07 PROCEDURE — 2500000003 HC RX 250 WO HCPCS: Performed by: NURSE ANESTHETIST, CERTIFIED REGISTERED

## 2020-07-07 PROCEDURE — 7200000001 HC VAGINAL DELIVERY

## 2020-07-07 PROCEDURE — 96361 HYDRATE IV INFUSION ADD-ON: CPT

## 2020-07-07 PROCEDURE — 0UQMXZZ REPAIR VULVA, EXTERNAL APPROACH: ICD-10-PCS | Performed by: OBSTETRICS & GYNECOLOGY

## 2020-07-07 PROCEDURE — 96372 THER/PROPH/DIAG INJ SC/IM: CPT

## 2020-07-07 PROCEDURE — 6370000000 HC RX 637 (ALT 250 FOR IP): Performed by: STUDENT IN AN ORGANIZED HEALTH CARE EDUCATION/TRAINING PROGRAM

## 2020-07-07 PROCEDURE — 88307 TISSUE EXAM BY PATHOLOGIST: CPT

## 2020-07-07 PROCEDURE — 6360000002 HC RX W HCPCS: Performed by: NURSE ANESTHETIST, CERTIFIED REGISTERED

## 2020-07-07 PROCEDURE — 96366 THER/PROPH/DIAG IV INF ADDON: CPT

## 2020-07-07 RX ORDER — DOCUSATE SODIUM 100 MG/1
100 CAPSULE, LIQUID FILLED ORAL 2 TIMES DAILY
Qty: 60 CAPSULE | Refills: 1 | Status: SHIPPED | OUTPATIENT
Start: 2020-07-07 | End: 2020-08-06

## 2020-07-07 RX ORDER — DIAPER,BRIEF,INFANT-TODD,DISP
EACH MISCELLANEOUS
Status: DISCONTINUED | OUTPATIENT
Start: 2020-07-07 | End: 2020-07-10 | Stop reason: HOSPADM

## 2020-07-07 RX ORDER — ACETAMINOPHEN 500 MG
1000 TABLET ORAL EVERY 6 HOURS PRN
Status: DISCONTINUED | OUTPATIENT
Start: 2020-07-07 | End: 2020-07-10 | Stop reason: HOSPADM

## 2020-07-07 RX ORDER — ROPIVACAINE HYDROCHLORIDE 2 MG/ML
INJECTION, SOLUTION EPIDURAL; INFILTRATION; PERINEURAL
Status: COMPLETED
Start: 2020-07-07 | End: 2020-07-07

## 2020-07-07 RX ORDER — LIDOCAINE HYDROCHLORIDE AND EPINEPHRINE 15; 5 MG/ML; UG/ML
INJECTION, SOLUTION EPIDURAL PRN
Status: DISCONTINUED | OUTPATIENT
Start: 2020-07-07 | End: 2020-07-07 | Stop reason: SDUPTHER

## 2020-07-07 RX ORDER — BISACODYL 10 MG
10 SUPPOSITORY, RECTAL RECTAL DAILY PRN
Status: DISCONTINUED | OUTPATIENT
Start: 2020-07-07 | End: 2020-07-10 | Stop reason: HOSPADM

## 2020-07-07 RX ORDER — ROPIVACAINE HYDROCHLORIDE 2 MG/ML
INJECTION, SOLUTION EPIDURAL; INFILTRATION; PERINEURAL PRN
Status: DISCONTINUED | OUTPATIENT
Start: 2020-07-07 | End: 2020-07-07 | Stop reason: SDUPTHER

## 2020-07-07 RX ORDER — DOCUSATE SODIUM 100 MG/1
100 CAPSULE, LIQUID FILLED ORAL 2 TIMES DAILY
Status: DISCONTINUED | OUTPATIENT
Start: 2020-07-07 | End: 2020-07-10 | Stop reason: HOSPADM

## 2020-07-07 RX ORDER — LANOLIN 72 %
OINTMENT (GRAM) TOPICAL PRN
Status: DISCONTINUED | OUTPATIENT
Start: 2020-07-07 | End: 2020-07-10 | Stop reason: HOSPADM

## 2020-07-07 RX ORDER — SIMETHICONE 80 MG
80 TABLET,CHEWABLE ORAL EVERY 6 HOURS PRN
Status: DISCONTINUED | OUTPATIENT
Start: 2020-07-07 | End: 2020-07-10 | Stop reason: HOSPADM

## 2020-07-07 RX ORDER — NALBUPHINE HCL 10 MG/ML
5 AMPUL (ML) INJECTION EVERY 4 HOURS PRN
Status: DISCONTINUED | OUTPATIENT
Start: 2020-07-07 | End: 2020-07-07

## 2020-07-07 RX ORDER — IBUPROFEN 600 MG/1
600 TABLET ORAL EVERY 6 HOURS PRN
Qty: 30 TABLET | Refills: 0 | Status: SHIPPED | OUTPATIENT
Start: 2020-07-07

## 2020-07-07 RX ORDER — ONDANSETRON 2 MG/ML
4 INJECTION INTRAMUSCULAR; INTRAVENOUS EVERY 4 HOURS PRN
Status: DISCONTINUED | OUTPATIENT
Start: 2020-07-07 | End: 2020-07-10 | Stop reason: HOSPADM

## 2020-07-07 RX ORDER — PROMETHAZINE HYDROCHLORIDE 25 MG/ML
25 INJECTION, SOLUTION INTRAMUSCULAR; INTRAVENOUS ONCE
Status: COMPLETED | OUTPATIENT
Start: 2020-07-07 | End: 2020-07-07

## 2020-07-07 RX ORDER — ONDANSETRON 2 MG/ML
4 INJECTION INTRAMUSCULAR; INTRAVENOUS EVERY 6 HOURS PRN
Status: DISCONTINUED | OUTPATIENT
Start: 2020-07-07 | End: 2020-07-07

## 2020-07-07 RX ORDER — NALOXONE HYDROCHLORIDE 0.4 MG/ML
0.4 INJECTION, SOLUTION INTRAMUSCULAR; INTRAVENOUS; SUBCUTANEOUS PRN
Status: DISCONTINUED | OUTPATIENT
Start: 2020-07-07 | End: 2020-07-07

## 2020-07-07 RX ORDER — IBUPROFEN 800 MG/1
800 TABLET ORAL EVERY 8 HOURS PRN
Status: DISCONTINUED | OUTPATIENT
Start: 2020-07-07 | End: 2020-07-10 | Stop reason: HOSPADM

## 2020-07-07 RX ORDER — MORPHINE SULFATE 10 MG/ML
10 INJECTION, SOLUTION INTRAMUSCULAR; INTRAVENOUS ONCE
Status: COMPLETED | OUTPATIENT
Start: 2020-07-07 | End: 2020-07-07

## 2020-07-07 RX ADMIN — ROPIVACAINE HYDROCHLORIDE 10 ML: 2 INJECTION, SOLUTION EPIDURAL; INFILTRATION at 04:45

## 2020-07-07 RX ADMIN — DOCUSATE SODIUM 100 MG: 100 CAPSULE, LIQUID FILLED ORAL at 12:43

## 2020-07-07 RX ADMIN — Medication 2.5 MILLION UNITS: at 01:30

## 2020-07-07 RX ADMIN — PROMETHAZINE HYDROCHLORIDE 25 MG: 25 INJECTION INTRAMUSCULAR; INTRAVENOUS at 01:30

## 2020-07-07 RX ADMIN — Medication 10 ML/HR: at 04:48

## 2020-07-07 RX ADMIN — IBUPROFEN 800 MG: 800 TABLET, FILM COATED ORAL at 12:41

## 2020-07-07 RX ADMIN — BENZOCAINE AND LEVOMENTHOL: 200; 5 SPRAY TOPICAL at 12:41

## 2020-07-07 RX ADMIN — LIDOCAINE HYDROCHLORIDE,EPINEPHRINE BITARTRATE 3 ML: 15; .005 INJECTION, SOLUTION EPIDURAL; INFILTRATION; INTRACAUDAL; PERINEURAL at 04:41

## 2020-07-07 RX ADMIN — MORPHINE SULFATE 10 MG: 10 INJECTION INTRAVENOUS at 01:31

## 2020-07-07 RX ADMIN — Medication 2.5 MILLION UNITS: at 04:46

## 2020-07-07 ASSESSMENT — PAIN SCALES - GENERAL
PAINLEVEL_OUTOF10: 4
PAINLEVEL_OUTOF10: 3

## 2020-07-07 NOTE — FLOWSHEET NOTE
Dr Jaylyn Garcia and DR Timur Goldberg at bedside, SVE and garvin balloon placement. Balloon inflated with 80ml NS and secured taught to leg. Pt tolerated well.

## 2020-07-07 NOTE — ANESTHESIA POSTPROCEDURE EVALUATION
Department of Anesthesiology  Postprocedure Note    Patient: Inés Hale  MRN: [id number]  YOB: 1995  Date of evaluation: 7/7/2020  Time:  9:08 AM     Procedure Summary     Date:  07/07/20 Room / Location:      Anesthesia Start:  0418 Anesthesia Stop:  2395    Procedure:  Labor Analgesia Diagnosis:      Scheduled Providers:   Responsible Provider:  Matheus Loyd MD    Anesthesia Type:  epidural ASA Status:  2          Anesthesia Type: epidural    Li Phase I: Li Score: 10    Li Phase II: Li Score: 10    Last vitals: Reviewed and per EMR flowsheets.        Anesthesia Post Evaluation    Patient location during evaluation: floor  Patient participation: complete - patient participated  Level of consciousness: awake  Pain score: 0  Airway patency: patent  Nausea & Vomiting: no vomiting and no nausea  Complications: no  Cardiovascular status: blood pressure returned to baseline  Respiratory status: acceptable  Hydration status: stable

## 2020-07-07 NOTE — PROGRESS NOTES
OB/GYN Progress Note    Variable decelerations noted on fetal heart monitor. Patient seen and examined. She has no complaints. Denies vaginal bleeding or leaking of fluid. Balloon in place. Patient turned to side and IVF bolus started. Variables resolved. Moderate variability. + acels. Continue pitocin and current management. Last BP noted in chart (not validated yet is 132/115). Discussed with RN. Reports Most likely due to repositioning. Repeat /79. Patient denies s/s of preE. Continue current management.      Priscilla Tejada  Ob-Gyn Resident PGY-4  Pager: [phone]

## 2020-07-07 NOTE — PROGRESS NOTES
Labor Progress Note    Roz Turcios is a 27 y.o. female  at 42w2d  The patient was seen and examined. Her pain is controlled but she is requesting morphine/phenergan for discomfort. She reports fetal movement is present, complains of contractions, denies loss of fluid, denies vaginal bleeding. Vital Signs:  Vitals:    20 2030 20 2325 20 0000 20 0031   BP: (!) 144/94 (!) 127/100 (!) 132/115 116/79   Pulse: 78 74 69 63   Resp: 16      Temp: 97.7 °F (36.5 °C)      TempSrc: Oral          FHT: 120, moderate variability, accelerations present, decelerations absent  Contractions: regular, every 2 minutes  Cervical Exam: not performed, balloon remains in place   Pitocin: @ 3 mu/min    Membranes: Intact  Scalp Electrode in place: absent  Intrauterine Pressure Catheter in Place: absent    Interventions: none    Assessment/Plan:  Roz Turcios is a 27 y.o. female  at 42w2d admitted for IOL 2/2 gHTN, Hx C/S x1, desires TOLAC   - GBS bacteruria, Pen G for GBS prophylaxis   - Morphine and phenergan given for pain   - BP elevated but no severe range              - PreE labs wnl, P/C 0.11              - Denies s/s of preE              - Continue low dose pitocin              - Munguia balloon in place               - C/S and  consent forms signed ( calculator 82%)              - cEFM/TOCO- cat 1, with regular contractions              - IVF @125 ml/hr              - Continue current management                Attending and senior resident updated and in agreement with plan Dr. Liz Cortes, DO  Ob/Gyn Resident  2020, 1:21 AM     OBGYN Resident Statement  I have discussed the case, including pertinent history and exam findings with the above resident. I have personally seen the patient. I agree with the assessment, plan and orders as documented. I have made changes to the above note as needed. Will discuss the case with above named attending.      Wade chavira  Ob-Gyn Resident PGY-4  Pager: [phone]

## 2020-07-07 NOTE — PROGRESS NOTES
Labor Progress Note    Marlin Contreras is a 27 y.o. female  at 42w4d  The patient was seen and examined. Her pain is well controlled. She reports fetal movement is present, denies contractions, denies loss of fluid, denies vaginal bleeding. Garvin balloon was placed and patient tolerated the procedure well. Balloon filled with 80 cc of fluid. Vital Signs:  Vitals:    20   BP: (!) 144/94   Pulse: 78   Resp: 16   Temp: 97.7 °F (36.5 °C)   TempSrc: Oral     FHT: 130, moderate variability, accelerations present, decelerations absent  Contractions: regular, every 1-2 minutes  Cervical Exam: /-3 of 3 per senior resident  Pitocin: @ 1 mu/min    Membranes: Intact  Scalp Electrode in place: absent  Intrauterine Pressure Catheter in Place: absent    Interventions: gravin ballon placed    Assessment/Plan:  Marlin Contreras is a 27 y.o. female  at 37w1d admitted for IOL 2/2 gHTN, Hx C/S x1, desires TOLAC   - GBS bacteruria, Pen G for GBS prophylaxis   - BP elevated but no severe range   - PreE labs wnl, P/C .11   - Denies s/s of preE   - Continue low dose pitocin   - Garvin balloon placed and filled with 80 cc of fluid   - C/S and  consent forms signed ( calculator 82%)   - cEFM/TOCO- cat 1, with regular contractions   - IVF @125 ml/hr   - Continue current management     Attending and senior resident updated and in agreement with plan Dr. Raymundo Huerta, DO  Ob/Gyn Resident  2020, 11:00 PM     OBGYN Resident Statement  I have discussed the case, including pertinent history and exam findings with the above resident. I have personally seen the patient. I agree with the assessment, plan and orders as documented. I have made changes to the above note as needed. Will discuss the case with above named attending.      Bill Grace  Ob-Gyn Resident PGY-4  Pager: [phone]

## 2020-07-07 NOTE — DISCHARGE SUMMARY
Obstetric Discharge Summary  [facility]    Patient Name: Rommel Dickinson  Patient : 1995  Primary Care Physician: Betty Holcomb MD  Admit Date: 2020    Principal Diagnosis: IUP at 37w1d, admitted for Induction of Labor 2/2 gHTN      Her pregnancy has been complicated by:   Patient Active Problem List   Diagnosis    Abdominal cramps    GBS bacteriuria    Rh+/RI/GBSbacteruria    History of  delivery affecting pregnancy    gHTN    Hx chlamydia (needs gurmeet)    High-risk pregnancy, third trimester     20 M Apg 1/3/9 Wt 6#1     (vaginal birth after )       Infection Present?: No  Hospital Acquired: No    Surgical Operations & Procedures:  [x] Pitocin Induction of Labor  [] Pitocin Augmentation of Labor  [] Prostaglandin Induction of Labor  [x] Mechanical Induction of Labor  [] Artificial Rupture of Membranes  [] Intrauterine Pressure Catheter  [] Fetal Scalp Electrode  [] Amnioinfusion  Analgesia: epidural   Delivery Type: Spontaneous Vaginal Delivery:    Laceration(s): Superficial right periurethral laceration, repaired with suture 3-0 vicryl    Consultations: NICU and Anesthesia anesthesia     Pertinent Findings & Procedures:   Rommel Dickinson is a 27 y.o. female  at 37w1d admitted for IOL 2/2 gHTN, Hx C/S x1, desiring TOLAC. She received pitocin, Pen G, garvin balloon, morphine/phenergan x1, epidural, SROM, clr. She delivered by induced vaginal a Live Born infant on 20. PreE labs wnl, P/C . 11.      Information for the patient's :  Alexy Remy [[id number]]   male  Birth Weight: 6 lb 1.7 oz (2.77 kg)      Apgars: 1 at 1 minute and 3 at 5 minutes and 9 at 10 minutes    Postpartum course: normal.      Course of patient: uncomplicated    Discharge to: Home    Readmission planned: no     Recommendations on Discharge:     Medications:      Medication List      START taking these medications    docusate sodium 100 MG

## 2020-07-07 NOTE — CARE COORDINATION
POST-PARTUM INITIAL DISCHARGE PLANNING/CARE COORDINATION    High-risk pregnancy, third trimester [O09.93]    HPI: Writer met w/ patient (patient's parent) at bedside to discuss DCP. Anticipate DC 2020 after  of Male infant on 2020 @ [phone] at 37w2d. Infant transferred to NICU after birth due to respiratory failure needing PPV after birth. Infant name on BC: Iram New. Infant to NICU. Infant PCP Dr. Tali Bhardwaj. FOB: Mateo Leavitt. Phone: [phone] verified Pending Medicaid Insurance w/patient (patient's parent) and address on facesheet. Faxed to [facility] for name/address/insurance correction :n/a    Writer notified patient (patient's parent)  has 30 days from date of birth to add infant to insurance policy. Rekha verbalized understanding and will call JFS, however, she is pending medicaid. She filled out all paperwork and submitted. Rekha verbalized has all necessary items for infant. No previous home care or dme and no anticipated need for home nursing or dme. CM continue to follow for any DC needs.

## 2020-07-07 NOTE — PROGRESS NOTES
Labor Progress Note    Binta Caraballo is a 27 y.o. female  at 42w2d    The patient was seen and examined. Her pain is now well controlled. She reports fetal movement is present, complains of contractions, complains of loss of fluid, denies vaginal bleeding. At 0340 patient was feeling more uncomfortable and garvin balloon fell out. SVE performed per RN. Her epidural was then given. FHR showed some variable decels and she was checked and found to be 9/90/+1. Upon exiting the room her water broke, revealing clear fluid. Patient was turned to side. Vital Signs:  Vitals:    20 0507 20 0515 20 0530 20 0542   BP: (!) 106/56 (!) 105/58 (!) 112/57    Pulse: 65 64 59    Resp:  15     Temp:    98.2 °F (36.8 °C)   TempSrc:    Oral     FHT: 145, moderate variability, accelerations present, decelerations, variables  Contractions: regular, every 2 minutes  Cervical Exam: 9 cm dilated, 90 effaced, 1 station--->complete dialation, confirmed by senior resident  Pitocin: @ 3 mu/min    Membranes: Ruptured clear fluid  Scalp Electrode in place: absent  Intrauterine Pressure Catheter in Place: absent    Interventions: position changes     Assessment/Plan:  Binta Caraballo is a 27 y.o. female  at 37w1d admitted for IOL 2/2 gHTN, Hx C/S x1, desires TOLAC              - GBS bacteruria, Pen G for GBS prophylaxis              - BPs stable               - PreE labs wnl, P/C .11              - Denies s/s of preE   - SROM, clr @ 0545               - Continue pitocin              - Garvin balloon out @ 0340   - Epidural              - C/S and  consent forms signed ( calculator 82%)              - cEFM/TOCO              - IVF @125 ml/hr              - Continue current management. Anticipate vaginal delivery                 Attending and senior resident updated and in agreement with plan.  Dr. Morelos Done en route to hospital.     Dada Alcala DO  Ob/Gyn Resident  2020, 5:47 AM         OBGYN Resident Statement  I have discussed the case, including pertinent history and exam findings with the above resident. I have personally seen the patient. I agree with the assessment, plan and orders as documented. I have made changes to the above note as needed. Will discuss the case with above named attending.      Estephanie Roca  Ob-Gyn Resident PGY-4  Pager: [phone]

## 2020-07-07 NOTE — ANESTHESIA PROCEDURE NOTES
Epidural Block    Patient location during procedure: OB  Reason for block: labor epidural  Staffing  Anesthesiologist: Laci Lundberg MD  Resident/CRNA: YONI Guerrero - CRNA  Performed: resident/CRNA   Preanesthetic Checklist  Completed: patient identified, pre-op evaluation, timeout performed, IV checked, risks and benefits discussed, monitors and equipment checked, anesthesia consent given, oxygen available and patient being monitored  Epidural  Patient position: sitting  Prep: Betadine and site prepped and draped  Patient monitoring: continuous pulse ox and frequent blood pressure checks  Approach: midline  Location: lumbar (1-5)  Injection technique: JANETTE air  Provider prep: mask and sterile gloves  Needle  Needle type: Tuohy   Needle gauge: 17 G  Needle length: 3.5 in  Needle insertion depth: 4 cm  Catheter type: side hole  Catheter size: 19 G  Catheter at skin depth: 12 cm  Test dose: negative  Assessment  Hemodynamics: stable  Attempts: 2
negative...

## 2020-07-07 NOTE — L&D DELIVERY NOTE
intervention:  reduced  Nuchal cord description:  loose nuchal cord  Cord around:  head  Number of loops:  1  Delayed cord clamping?:  Yes  Cord clamped date/time:  2020 0651  Cord blood disposition:  Lab  Gases sent?:  Yes     Placenta    Date/time:  2020 06:56:00  Removal:  Spontaneous  Appearance:  Intact  Disposition:  Lab, Pathology     Delivery Resuscitation    Method:  Bulb Suction, Stimulation, PPV > 1 minute, CPAP, Suctioning     Apgars    Living status:  Living  Apgars   1 Minute:   5 Minute:   10 Minute 15 Minute 20 Minute   Skin Color:        Heart Rate:        Reflex Irritability:        Muscle Tone:        Respiratory Effort: Total:                Apgars Assigned By:  Luke Mccracken      Measurements       Delivery Information    Surgical or additional est. blood loss (mL):  0 (View Only):  Edit in Flowsheets   Combined est. blood loss (mL):  0          Vaginal Delivery Note  Department of Obstetrics and Gynecology  [facility]       Patient: Erica Hugo   : 1995  MRN: [id number]   Date of delivery: 20     Pre-operative Diagnosis: Erica Hugo  at 37w2d, Induced labor 2/2 gHTN  HX C/S x1, desires TOLAC    Post-operative Diagnosis:  Same and living male    Delivering Obstetrician & Assistant(s): Dr. Antonella Patricia DO, Dr. Stevenson Pallas, PGY4; Mayra Smith DO    Infant Information:   Information for the patient's :  Tai Urena [[id number]]        Information for the patient's :  Tai Urena [[id number]]        Apgar scores: 1 at 1 minute, 3 at 5 minutes and 9 at 10 minutes    Anesthesia:  epidural anesthesia    Application and Delivery:    She was known to be GBS positive and received PenG antibiotic prophylaxis. The patient progressed well, received an epidural, became complete and felt the urge to push.  After pushing with contractions the fetal head delivered Cephalic, left occiput anterior over an intact perineum, nuchal cord was present and reduced. The anterior, then posterior shoulder delivered easily and atraumatically followed by the rest of the infant. Nose and mouth suctioned with bulb suction, infant was stimulated and dried. Cord was clamped and cut after one minute delayed cord clamping  and infant was placed on mom, and then attended by RN and NICU for evaluation. Baby was taken to the warmer for further NICU evaluation. The delivery of the placenta was spontaneous and appeared intact, whole and that the umbilical cord had three vessels noted. Pitocin was started. The vagina was swept of all clots and debris. The perineum and vagina were evaluated and a superficial right periurethral laceration was found and repaired in standard fashion with 3-0 vicryl. Mother and baby tolerated procedure well. Dr. Per Ordaz was present for entire delivery.     Delivery Summary:       Specimen: placenta sent to pathology, cord blood and cord gases  Quantitative blood loss:  100ml immediately following delivery  Condition:  infant stable to special care nursery  Counts: instrument and sponge counts correct  Blood Type and Rh: O POSITIVE    Rubella Immunity Status: immune    Guilherme Farrar DO  Ob/Gyn Resident  7/7/2020, 7:13 AM

## 2020-07-07 NOTE — H&P
OBSTETRICAL HISTORY [facility]    Date: 2020       Time: 9:33 PM   Patient Name: Jose D Moy     Patient : 1995  Room/Bed: 9279/3771-98    Admission Date/Time: 2020  8:07 PM      CC: Induction of Labor 2/2 gHTN, Hx C/S x1, desires TOLAC     HPI: Jose D Moy is a 27 y.o.  at 42w4d who presents for IOL 2/2 gHTN. Patient has a hx of C/S x1, desiring TOLAC. Patient denies any fever, chills, N/V, headaches, vision changes, chest pain, shortness of breath, RUQ pain, abdominal pain, and increased swelling/tenderness in bilateral lower extremities. Patient denies any vaginal discharge and any urinary complaints. The patient reports fetal movement is present, denies contractions, denies loss of fluid, denies vaginal bleeding. Pregnancy is complicated by gHTN, hx C/S x1 (G1, breech), hx chlamydia in pregnancy. DATING:  LMP: Patient's last menstrual period was 10/20/2019.   Estimated Date of Delivery: 20   Based on: early ultrasound, at 8w 3d    PREGNANCY RISK FACTORS:  Patient Active Problem List   Diagnosis    Abdominal cramps    GBS bacteriuria    Rh+/RI/GBSbacteruria    History of  delivery affecting pregnancy    gHTN    Hx chlamydia (needs gurmeet)    High-risk pregnancy, third trimester        Steroids Given In This Pregnancy:  no     REVIEW OF SYSTEMS:  Constitutional: negative fever, negative chills  HEENT: negative visual disturbances, negative headaches  Respiratory: negative dyspnea, negative cough  Cardiovascular: negative chest pain,  negative palpitations  Gastrointestinal: negative abdominal pain, negative RUQ pain, negative N/V, negative diarrhea, negative constipation  Genitourinary: negative dysuria, negative vaginal discharge, negative vaginal bleeding  Dermatological: negative rash  Hematologic: negative bruising  Immunologic/Lymphatic: negative recent illness, negative recent sick contact  Musculoskeletal: negative back pain, negative myalgias, negative arthralgias  Neurological:  negative dizziness, negative weakness  Behavior/Psych: negative depression, negative anxiety    OBSTETRICAL HISTORY:   OB History    Para Term  AB Living   2 1 1 0 0 1   SAB TAB Ectopic Molar Multiple Live Births   0 0 0 0 0 1      # Outcome Date GA Lbr Tarik/2nd Weight Sex Delivery Anes PTL Lv   2 Current            1 Term 10/04/16   6 lb 4 oz (2.835 kg) M CS-Unspec Spinal  MARIA LUISA      Complications: Breech birth, fetus 1       PAST MEDICAL HISTORY:   has a past medical history of gHTN. PAST SURGICAL HISTORY:   has a past surgical history that includes  section (2016). ALLERGIES:  is allergic to other and tetanus toxoids. MEDICATIONS:  Prior to Admission medications    Medication Sig Start Date End Date Taking? Authorizing Provider   [id number] PO Take 1 tablet by mouth daily   Yes Historical Provider, MD       FAMILY HISTORY:  family history includes Cancer in her paternal grandfather; Diabetes in her maternal grandfather; High Blood Pressure in her maternal grandmother and paternal grandmother; High Cholesterol in her maternal grandmother; Stroke in her maternal grandfather. SOCIAL HISTORY:   reports that she has never smoked. She has never used smokeless tobacco. She reports that she does not drink alcohol or use drugs.     VITALS:  Vitals:    20 2030   BP: (!) 144/94   Pulse: 78   Resp: 16   Temp: 97.7 °F (36.5 °C)   TempSrc: Oral       PHYSICAL EXAM:  Fetal Heart Monitor:  Baseline Heart Rate 120, moderate variability, present accelerations, absent decelerations  [place]: regular, every 3 minutes contractions    General appearance:  awake, alert, cooperative, no apparent distress, and appears stated age, no apparent distress, alert and cooperative  HEENT: head atraumatic, normocephalic, moist mucous membranes, trachea midline  Neurologic:  alert, oriented, normal speech, no focal findings or movement disorder noted  Lungs:  No increased work of breathing, good air exchange, clear to auscultation bilaterally, no crackles or wheezing  Heart:  regular rate and rhythm and no murmur    Abdomen:  soft and gravid, no signs of abruption or chorioamnionitis, non-tender   Extremities:  no calf tenderness, non edematous, DTR's: +2/4 bilateral all extremities   Musculoskeletal: Gross strength equal and intact throughout, no gross abnormalities, range of motion normal in hips, knees, shoulders and spine, CVA tenderness: none  Psychiatric: Mood appropriate, normal affect   Rectal Exam: not indicated  Sterile Vaginal Exam:  Cervix: No cervical motion tenderness   Uterus: Is gravid, Normal size, shape, consistency and non-tender   Adnexa: Non-tender, no palpable masses  Cervix: 1 cm dilated, 50 % effaced, -3 station, posterior position (out of 3 station), medium consistency, FETAL POSITION: Cephalic, confirmed by US, Membranes intact   Bishops Score: 3     0 1 2 3   Position Posterior Intermediate Anterior -   Consistency Firm Intermediate Soft -   Effacement 0-30% 31-50% 51-80% >80%   Dilation 0cm 1-2cm 3-4cm >5cm   Fetal Station -3 -2 -1, 0 +1, +2       LIMITED BEDSIDE US:  Position: Cephalic  Placental Location: anterior  Fetal Heart Tones: Present  Fetal Movement: Present  Amniotic Fluid Index/Volume: >2x2 pocket cm  Estimated Fetal Weight:  6 lbs 3oz    PRENATAL LAB RESULTS:   Blood Type/Rh: O pos  Antibody Screen: negative  Hemoglobin, Hematocrit, Platelets: 90.4 / 40.5 / 289  Rubella: immune  T.  Pallidum, IgG: non-reactive   Hepatitis B Surface Antigen: negative   HIV: negative   Sickle Cell Screen: not available  Gonorrhea: negative  Chlamydia: positive 12/10/19  Urine culture: positive - GBS, date: 19    3 hour Glucose Tolerance Test: Fastin; 1 hour: 99; 2 hour  80; 3 hour: 73    Group B Strep: + bacteruria 19  Cystic Fibrosis Screen: not available  First Trimester Screen: not available  MSAFP/Multiple documented. I have made changes to the above note as needed. Will discuss the case with above named attending. Dirk Hogan  Ob-Gyn Resident PGY-4  Pager: [phone]          Attending Physician Statement  I have discussed the care of Inés Hale, including pertinent history and exam findings,  with the resident. I have reviewed the key elements of all parts of the encounter with the resident. Patient is a 27 y.o. Caryle Motes at 42w4d with newly diagnosed gHTN. Presents for IOL and desired TOLAC. H/O one prior  due to fetal breech presentation. Category I FHTs with contractions q 3min. BP elevated but not severe. No s/s preE. Will admit and proceed with IOL with garvin and pitocin. I agree with the assessment, plan and orders as documented by the resident.   (GE Modifier)    Electronically signed by Leslye Ku So, DO on 2020 at 7:47 AM

## 2020-07-07 NOTE — ANESTHESIA PRE PROCEDURE
cramps R10.9    GBS bacteriuria R82.71    Rh+/RI/GBSbacteruria O09.93    History of  delivery affecting pregnancy O34.219    gHTN O13.9    Hx chlamydia (needs gurmeet) A74.9    High-risk pregnancy, third trimester O09.93       Past Medical History:        Diagnosis Date    [facility] 2020       Past Surgical History:        Procedure Laterality Date     SECTION  2016       Social History:    Social History     Tobacco Use    Smoking status: Never Smoker    Smokeless tobacco: Never Used   Substance Use Topics    Alcohol use:  No                                Counseling given: Not Answered      Vital Signs (Current):   Vitals:    20 0100 20 0200 20 0300 20 0400   BP: 129/75 (!) 120/90 106/70 123/66   Pulse: 65 72 60 69   Resp: 16 18 16    Temp:       TempSrc:                                                  BP Readings from Last 3 Encounters:   20 123/66   20 (!) 149/104   20 (!) 148/99       NPO Status:                                                                                 BMI:   Wt Readings from Last 3 Encounters:   20 121 lb (54.9 kg)   20 123 lb (55.8 kg)   20 132 lb (59.9 kg)     There is no height or weight on file to calculate BMI.    CBC:   Lab Results   Component Value Date    WBC 8.6 2020    RBC 5.10 2020    HGB 13.2 2020    HCT 42.0 2020    MCV 82.4 2020    RDW 13.3 2020     2020       CMP:   Lab Results   Component Value Date     2020    K 3.7 2020     2020    CO2 22 2020    BUN 11 2020    CREATININE 0.67 2020    GFRAA >60 2020    LABGLOM >60 2020    GLUCOSE 81 2020    PROT 7.2 2020    CALCIUM 9.3 2020    BILITOT 0.36 2020    ALKPHOS 251 2020    AST 19 2020    ALT 8 2020       POC Tests: No results for input(s): POCGLU, POCNA, POCK, POCCL, POCBUN, POCHEMO, POCHCT in the last 72 hours. Coags: No results found for: PROTIME, INR, APTT    HCG (If Applicable):   Lab Results   Component Value Date    PREGTESTUR positive 12/09/2019    HCGQUANT <1 01/23/2019        ABGs: No results found for: PHART, PO2ART, YQC5MYY, VGH4JWG, BEART, P2JAMGBU     Type & Screen (If Applicable):  No results found for: LABABO, LABRH    Drug/Infectious Status (If Applicable):  Lab Results   Component Value Date    HEPCAB NONREACTIVE 12/09/2019       COVID-19 Screening (If Applicable):   Lab Results   Component Value Date    COVID19 Not Detected 07/06/2020         Anesthesia Evaluation  Patient summary reviewed and Nursing notes reviewed no history of anesthetic complications:   Airway: Mallampati: II  TM distance: >3 FB   Neck ROM: full  Mouth opening: > = 3 FB Dental:          Pulmonary:Negative Pulmonary ROS and normal exam                               Cardiovascular:  Exercise tolerance: good (>4 METS),   (+) hypertension (Gestational):,                   Neuro/Psych:   Negative Neuro/Psych ROS              GI/Hepatic/Renal: Neg GI/Hepatic/Renal ROS            Endo/Other: Negative Endo/Other ROS                    Abdominal:           Vascular: negative vascular ROS. Anesthesia Plan      epidural     ASA 2             Anesthetic plan and risks discussed with patient.                       YONI Vivar - CRNA   7/7/2020

## 2020-07-07 NOTE — FLOWSHEET NOTE
Carlie at bedside for epidural. Pt positioned sitting on side of bed. Consent obtained, time out performed 0421. Procedure start 6938, catheter placement 4794, test [id number]. Pt positioned left lateral tilt. Pt tolerated well. Rn will continue to monitor.

## 2020-07-07 NOTE — FLOWSHEET NOTE
0340 Pt up to the bathroom, garvin balloon out, SVE per writer Rn 4/70/-2. Pt uncomfortable and would like epidural. Dr Johnson Suarez and dr Chance Shine updated.

## 2020-07-07 NOTE — PLAN OF CARE
Problem: Urinary Retention:  Goal: Urinary elimination within specified parameters  Description: Urinary elimination within specified parameters  Outcome: Ongoing     Problem: VAGINAL DELIVERY - RECOVERY AND POST PARTUM  Goal: Vital signs are medically acceptable  Outcome: Ongoing  Goal: Patient will remain free of falls  Outcome: Ongoing  Goal: Fundus firm at midline  Outcome: Ongoing  Goal: Moderate rubra without clots, no purulent discharge, no foul smelling lochia  Outcome: Ongoing  Goal: Empties bladder  Outcome: Ongoing  Goal: Verbalizes understanding of normal bowel function resumption  Outcome: Ongoing  Goal: Edema will be absent or minimal  Outcome: Ongoing  Goal: Breasts are soft with nipple integrity intact  Outcome: Ongoing  Goal: Demonstrates appropriate breast feeding techniques  Outcome: Ongoing  Goal: Appropriate behavior observed  Outcome: Ongoing  Goal: Positive Mother-Baby interactions are observed  Outcome: Ongoing  Goal: Perineum intact without discharge or hematoma  Outcome: Ongoing  Goal: Ambulates independently  Outcome: Ongoing     Problem: PAIN  Goal: Patient's pain/discomfort is manageable  Outcome: Ongoing     Problem: KNOWLEDGE DEFICIT  Goal: Patient/S.O. demonstrates understanding of disease process, treatment plan, medications, and discharge instructions.   Outcome: Ongoing

## 2020-07-08 LAB
C. TRACHOMATIS DNA ,URINE: NEGATIVE
N. GONORRHOEAE DNA, URINE: NEGATIVE
SPECIMEN DESCRIPTION: NORMAL

## 2020-07-08 PROCEDURE — 6370000000 HC RX 637 (ALT 250 FOR IP): Performed by: STUDENT IN AN ORGANIZED HEALTH CARE EDUCATION/TRAINING PROGRAM

## 2020-07-08 PROCEDURE — 1220000000 HC SEMI PRIVATE OB R&B

## 2020-07-08 RX ADMIN — DOCUSATE SODIUM 100 MG: 100 CAPSULE, LIQUID FILLED ORAL at 19:53

## 2020-07-08 NOTE — PLAN OF CARE
Problem: Anxiety:  Goal: Level of anxiety will decrease  Description: Level of anxiety will decrease  Outcome: Ongoing     Problem: Breathing Pattern - Ineffective:  Goal: Able to breathe comfortably  Description: Able to breathe comfortably  Outcome: Ongoing     Problem: Fluid Volume - Imbalance:  Goal: Absence of imbalanced fluid volume signs and symptoms  Description: Absence of imbalanced fluid volume signs and symptoms  Outcome: Ongoing  Goal: Absence of intrapartum hemorrhage signs and symptoms  Description: Absence of intrapartum hemorrhage signs and symptoms  Outcome: Ongoing  Goal: Absence of postpartum hemorrhage signs and symptoms  Description: Absence of postpartum hemorrhage signs and symptoms  Outcome: Ongoing     Problem: Infection - Intrapartum Infection:  Goal: Will show no infection signs and symptoms  Description: Will show no infection signs and symptoms  Outcome: Ongoing     Problem: Labor Process - Prolonged:  Goal: Labor progression, first stage, within specified pattern  Description: Labor progression, first stage, within specified pattern  Outcome: Ongoing  Goal: Labor progession, second stage, within specified pattern  Description: Labor progession, second stage, within specified pattern  Outcome: Ongoing  Goal: Uterine contractions within specified parameters  Description: Uterine contractions within specified parameters  Outcome: Ongoing     Problem:  Screening:  Goal: Ability to make informed decisions regarding treatment has improved  Description: Ability to make informed decisions regarding treatment has improved  Outcome: Ongoing     Problem: Pain - Acute:  Goal: Pain level will decrease  Description: Pain level will decrease  Outcome: Ongoing  Goal: Able to cope with pain  Description: Able to cope with pain  Outcome: Ongoing     Problem: Tissue Perfusion - Uteroplacental, Altered:  Goal: Absence of abnormal fetal heart rate pattern  Description: Absence of abnormal fetal heart rate pattern  Outcome: Ongoing     Problem: Urinary Retention:  Goal: Experiences of bladder distention will decrease  Description: Experiences of bladder distention will decrease  Outcome: Ongoing  Goal: Urinary elimination within specified parameters  Description: Urinary elimination within specified parameters  Outcome: Ongoing     Problem: VAGINAL DELIVERY - RECOVERY AND POST PARTUM  Goal: Vital signs are medically acceptable  Outcome: Ongoing  Goal: Patient will remain free of falls  Outcome: Ongoing  Goal: Fundus firm at midline  Outcome: Ongoing  Goal: Moderate rubra without clots, no purulent discharge, no foul smelling lochia  Outcome: Ongoing  Goal: Empties bladder  Outcome: Ongoing  Goal: Verbalizes understanding of normal bowel function resumption  Outcome: Ongoing  Goal: Edema will be absent or minimal  Outcome: Ongoing  Goal: Breasts are soft with nipple integrity intact  Outcome: Ongoing  Goal: Demonstrates appropriate breast feeding techniques  Outcome: Ongoing  Goal: Appropriate behavior observed  Outcome: Ongoing  Goal: Positive Mother-Baby interactions are observed  Outcome: Ongoing  Goal: Perineum intact without discharge or hematoma  Outcome: Ongoing  Goal: Ambulates independently  Outcome: Ongoing     Problem: PAIN  Goal: Patient's pain/discomfort is manageable  Outcome: Ongoing     Problem: KNOWLEDGE DEFICIT  Goal: Patient/S.O. demonstrates understanding of disease process, treatment plan, medications, and discharge instructions.   Outcome: Ongoing     Problem: Discharge Planning:  Goal: Discharged to appropriate level of care  Description: Discharged to appropriate level of care  Outcome: Ongoing     Problem: Constipation:  Goal: Bowel elimination is within specified parameters  Description: Bowel elimination is within specified parameters  Outcome: Ongoing     Problem: Mood - Altered:  Goal: Mood stable  Description: Mood stable  Outcome: Ongoing

## 2020-07-08 NOTE — CARE COORDINATION
Social Work     Sw reviewed medical record (current active problem list) and tox screens and found no concerns. Sw attempted to meet with parents x 2, mom was initially in the bathroom, then later in the shower. Sw did meet with fob briefly to explain Sw role, inquire if any needs or concerns, and provide safe sleep education and discuss. Fob denied any needs or questions and informs baby has a safe sleep environment (alizat & beau). Fob denied any social needs or questions at this time. Sw encouraged fob to let mom know Sw is available for support as needed and to reach out if any issues or concerns arise.

## 2020-07-09 VITALS
TEMPERATURE: 98.2 F | HEART RATE: 70 BPM | OXYGEN SATURATION: 100 % | SYSTOLIC BLOOD PRESSURE: 127 MMHG | RESPIRATION RATE: 16 BRPM | DIASTOLIC BLOOD PRESSURE: 80 MMHG

## 2020-07-09 LAB — SURGICAL PATHOLOGY REPORT: NORMAL

## 2020-07-09 NOTE — PROGRESS NOTES
CLINICAL PHARMACY NOTE: MEDS TO [address] Select Patient?: No  Total # of Prescriptions Filled: 1   The following medications were delivered to the patient:  · Ibuprofen 600  Total # of Interventions Completed: 0  Time Spent (min): 0    Additional Documentation: meds delivered to the pt room on 7/9/20

## 2020-07-09 NOTE — PROGRESS NOTES
POST PARTUM DAY # 2    Marlin Contreras is a 27 y.o. female  This patient was seen & examined today. Her pregnancy was complicated by:   Patient Active Problem List   Diagnosis    Abdominal cramps    GBS bacteriuria    Rh+/RI/GBSbacteruria    History of  delivery affecting pregnancy    gHTN    Hx chlamydia (needs gurmeet)    High-risk pregnancy, third trimester     20 M Apg 1/3/9 Wt 6#1     (vaginal birth after )     Today she is doing well without any chief complaint. Her lochia is light. She denies chest pain, shortness of breath, headache and lightheadedness. She is bottle feeding and she denies any breast tenderness. She is ambulating well. Her voiding pattern is normal. I reviewed signs and symptoms of post partum depression with the patient, she currently denies any of these symptoms. She is tolerating solids. Vital Signs:  Vitals:    20 2000 20 0000 20 0800 20 1956   BP: 121/74 120/70 123/85 125/82   Pulse: 71 70 68 83   Resp: 16 16 16 16   Temp: 98.5 °F (36.9 °C) 98.2 °F (36.8 °C) 98.3 °F (36.8 °C) 98.4 °F (36.9 °C)   TempSrc:   Oral Oral   SpO2:         Physical Exam:  General:  no apparent distress, alert and cooperative  Neurologic:  alert, oriented, normal speech, no focal findings or movement disorder noted  Lungs:  No increased work of breathing, good air exchange, clear to auscultation bilaterally, no crackles or wheezing  Heart:  regular rate and rhythm    Abdomen: abdomen soft, non-distended, non-tender  Fundus: non-tender, normal size, firm, below umbilicus  Extremities:  no calf tenderness, non edematous    Lab:  Lab Results   Component Value Date    HGB 13.2 2020     Lab Results   Component Value Date    HCT 42.0 2020       Assessment/Plan:  1Javier Contreras is a [id number] PPD # 2 s/p    - Doing well, VSS   - male infant in [facility], circumcision desired   - Encourage ambulation   - Labs if symptomatic   - Motrin/tylenol   - Plan on d/c today  2. Rh positive/Rubella immune  - No indication for rhogam  3. Bottle feeding   4. gHTN   - BP normotensive   - Denies s/s preE   - PreE labs wnl P/C 0.11   - Follow up within 1 week for BP check  5. Continue post partum care    Counseling Completed:  Secondary Smoke risks and Sudden Infant Death Syndrome were reviewed with recommendations. Infant sleeping, \"back to sleep\" and avoidance of co-sleeping recommendations were reviewed. Signs and Symptoms of Post Partum Depression were reviewed. The patient is to call if any occur. Signs and symptoms of Mastitis were reviewed. The patient is to call if any occur for follow up. Discharge instructions including pelvic rest, no driving with pain medicine and office follow-up were reviewed with patient     Attending Physician: Dr. Bar Schwarz, DO  Ob/Gyn Resident   2020, 4:45 AM     OBGYN Resident Statement  I have discussed the case, including pertinent history and exam findings with the above resident. I have personally seen the patient. I agree with the assessment, plan and orders as documented. I have made changes to the above note as needed. Will discuss the case with above named attending. Attending Physician Statement  I have discussed the care of Trever Cox, including pertinent history and exam findings,  with the resident. I have reviewed the key elements of all parts of the encounter with the resident. Patient seen and evaluated. Doing well PPD#2 s/p . No complaints. Bottle feeding. No s/s preE. Baby in NICU and doing well per patient. No circ due to natural circumcision and hypospadias. VSS, afebrile. Fundus firm below umbilicus. Okay for discharge to home. Discharge instructions reviewed and all questions answered. Follow up in 1-2 weeks for postpartum visit. I agree with the assessment, plan and orders as documented by the resident.   (GE Modifier)    Electronically signed by Lorena Ortiz, DO on

## 2020-07-09 NOTE — PLAN OF CARE
Problem: Fluid Volume - Imbalance:  Goal: Absence of imbalanced fluid volume signs and symptoms  Description: Absence of imbalanced fluid volume signs and symptoms  7/9/2020 0411 by Leticia Guillory RN  Outcome: Met This Shift  7/8/2020 1858 by Eve David RN  Outcome: Ongoing  Goal: Absence of intrapartum hemorrhage signs and symptoms  Description: Absence of intrapartum hemorrhage signs and symptoms  7/9/2020 0411 by Leticia Guillory RN  Outcome: Met This Shift  7/8/2020 1858 by Eve David RN  Outcome: Ongoing  Goal: Absence of postpartum hemorrhage signs and symptoms  Description: Absence of postpartum hemorrhage signs and symptoms  7/9/2020 0411 by Leticia Guillory RN  Outcome: Met This Shift  7/8/2020 1858 by Eve David RN  Outcome: Ongoing     Problem: Pain - Acute:  Goal: Pain level will decrease  Description: Pain level will decrease  7/9/2020 0411 by Leticia Guillory RN  Outcome: Met This Shift  7/8/2020 1858 by Eve David RN  Outcome: Ongoing  Goal: Able to cope with pain  Description: Able to cope with pain  7/9/2020 0411 by Leticia Guillory RN  Outcome: Met This Shift  7/8/2020 1858 by Eve David RN  Outcome: Ongoing     Problem: PAIN  Goal: Patient's pain/discomfort is manageable  7/9/2020 0411 by Leticia Guillory RN  Outcome: Met This Shift  7/8/2020 1858 by Eve David RN  Outcome: Ongoing     Problem: Discharge Planning:  Goal: Discharged to appropriate level of care  Description: Discharged to appropriate level of care  7/9/2020 0411 by Leticia Guillory RN  Outcome: Met This Shift  7/8/2020 1858 by Eve David RN  Outcome: Ongoing     Problem: Constipation:  Goal: Bowel elimination is within specified parameters  Description: Bowel elimination is within specified parameters  7/9/2020 0411 by Leticia Guillory RN  Outcome: Met This Shift  7/8/2020 1858 by Eve David RN  Outcome: Ongoing     Problem: Mood - Altered:  Goal: Mood stable  Description: Mood stable  7/9/2020 0411 by Rachna Mohamud

## 2020-07-09 NOTE — FLOWSHEET NOTE
Discharge instructions given. Pt walked to home away from home. Understands importance of follow up.

## 2020-09-14 ENCOUNTER — POSTPARTUM VISIT (OUTPATIENT)
Dept: OBGYN | Age: 25
End: 2020-09-14
Payer: MEDICAID

## 2020-09-14 VITALS
WEIGHT: 105.8 LBS | HEIGHT: 64 IN | DIASTOLIC BLOOD PRESSURE: 82 MMHG | SYSTOLIC BLOOD PRESSURE: 130 MMHG | BODY MASS INDEX: 18.06 KG/M2

## 2020-09-14 LAB — HGB, POC: 13.5

## 2020-09-16 ENCOUNTER — TELEPHONE (OUTPATIENT)
Dept: OBGYN | Age: 25
End: 2020-09-16

## 2020-09-16 RX ORDER — NORETHINDRONE ACETATE AND ETHINYL ESTRADIOL AND FERROUS FUMARATE 1MG-20(24)
1 KIT ORAL DAILY
Qty: 28 TABLET | Refills: 12 | Status: SHIPPED | OUTPATIENT
Start: 2020-09-16 | End: 2021-09-14 | Stop reason: SDUPTHER

## 2020-09-16 NOTE — TELEPHONE ENCOUNTER
Pt left a voicemail stating she was supposed to get some cycle control medication sent in to her pharmacy but it is still not there. Pt was in on 9/14/2020.  Please advise

## 2020-10-22 ENCOUNTER — TELEPHONE (OUTPATIENT)
Dept: OBGYN | Age: 25
End: 2020-10-22

## 2020-10-22 NOTE — TELEPHONE ENCOUNTER
Patient called and left message with questions about bleeding after starting birth control. . I advised patient that irregular bleeding with the start of new birth control is normal. Patient advised to continue OCP and give it time. Advised patient that if bleeding becomes heavy, clots or pain patient should notify office or go to ED if we are not in office. Patient voiced understanding.

## 2021-09-14 DIAGNOSIS — N92.6 IRREGULAR MENSES: ICD-10-CM

## 2021-09-14 RX ORDER — NORETHINDRONE ACETATE AND ETHINYL ESTRADIOL AND FERROUS FUMARATE 1MG-20(24)
1 KIT ORAL DAILY
Qty: 28 TABLET | Refills: 5 | Status: SHIPPED | OUTPATIENT
Start: 2021-09-14 | End: 2022-03-24

## 2022-03-24 DIAGNOSIS — N92.6 IRREGULAR MENSES: ICD-10-CM

## 2022-03-24 RX ORDER — NORETHINDRONE ACETATE/ETHINYL ESTRADIOL AND FERROUS FUMARATE 1MG-20(24)
KIT ORAL
Qty: 28 TABLET | Refills: 1 | Status: SHIPPED | OUTPATIENT
Start: 2022-03-24

## 2022-04-27 ENCOUNTER — OFFICE VISIT (OUTPATIENT)
Dept: OBGYN | Age: 27
End: 2022-04-27

## 2022-04-27 VITALS
SYSTOLIC BLOOD PRESSURE: 122 MMHG | BODY MASS INDEX: 18.64 KG/M2 | DIASTOLIC BLOOD PRESSURE: 76 MMHG | WEIGHT: 109.2 LBS | HEIGHT: 64 IN

## 2022-04-27 DIAGNOSIS — Z01.419 ENCOUNTER FOR ANNUAL ROUTINE GYNECOLOGICAL EXAMINATION: Primary | ICD-10-CM

## 2022-04-27 DIAGNOSIS — N92.6 IRREGULAR MENSES: ICD-10-CM

## 2022-04-27 PROCEDURE — 99395 PREV VISIT EST AGE 18-39: CPT | Performed by: ADVANCED PRACTICE MIDWIFE

## 2022-04-27 RX ORDER — NORETHINDRONE ACETATE AND ETHINYL ESTRADIOL AND FERROUS FUMARATE 1MG-20(24)
1 KIT ORAL DAILY
Qty: 28 TABLET | Refills: 12 | Status: SHIPPED | OUTPATIENT
Start: 2022-04-27

## 2022-04-27 NOTE — PROGRESS NOTES
YEARLY PHYSICAL    Date of service: 2022    Zachery Leyden  Is a 27 y.o.   female    PT's PCP is: Vicki Rojas MD     : 1995                                             Subjective:       Patient's last menstrual period was 2022 (approximate). Are your menses regular: yes    OB History    Para Term  AB Living   2 2 2     2   SAB IAB Ectopic Molar Multiple Live Births           0 2      # Outcome Date GA Lbr Tarik/2nd Weight Sex Delivery Anes PTL Lv   2 Term 20 37w2d / 00:30 6 lb 1.7 oz (2.77 kg) M  EPI N MARIA LUISA   1 Term 10/04/16   6 lb 4 oz (2.835 kg) M CS-Unspec Spinal  MARIA LUISA      Complications: Breech birth, fetus 1        Social History     Tobacco Use   Smoking Status Never Smoker   Smokeless Tobacco Never Used        Social History     Substance and Sexual Activity   Alcohol Use No       Family History   Problem Relation Age of Onset    High Blood Pressure Maternal Grandmother     High Cholesterol Maternal Grandmother     Diabetes Maternal Grandfather     Stroke Maternal Grandfather     High Blood Pressure Paternal Grandmother     Cancer Paternal Grandfather        Any family history of breast or ovarian cancer: No    Any family history of blood clots: No    Have you had a positive covid test: Yes    Have you had the covid immunization: Yes      Allergies:  Other and Tetanus toxoids      Current Outpatient Medications:     Norethin Ace-Eth Estrad-FE 1-20 MG-MCG(24) TABS, Take 1 tablet by mouth daily, Disp: 28 tablet, Rfl: 12    MARIO 24 FE 1-20 MG-MCG(24) TABS, Take 1 tablet by mouth once daily, Disp: 28 tablet, Rfl: 1    ibuprofen (ADVIL;MOTRIN) 600 MG tablet, Take 1 tablet by mouth every 6 hours as needed for Pain (Patient not taking: Reported on 2020), Disp: 30 tablet, Rfl: 0    [id number]-[id number]-ZB PO, Take 1 tablet by mouth daily (Patient not taking: Reported on 2022), Disp: , Rfl:     Social History     Substance and Sexual Activity   Sexual Activity Not Currently    Partners: Male       Any bleeding or pain with intercourse:  na    Last Yearly:      Last pap: 2020    Last HPV: never    Last Mammogram: never    Last Crissie Buddhism never    Last colorectal screen- type:never*  date  never    Do you do self breast exams: No    Past Medical History:   Diagnosis Date    gHTN 2020       Past Surgical History:   Procedure Laterality Date     SECTION         Family History   Problem Relation Age of Onset    High Blood Pressure Maternal Grandmother     High Cholesterol Maternal Grandmother     Diabetes Maternal Grandfather     Stroke Maternal Grandfather     High Blood Pressure Paternal Grandmother     Cancer Paternal Grandfather        Chief Complaint   Patient presents with    Gynecologic Exam     Yearly exam. Last pap 2020 negative. PE:  Vital Signs  Blood pressure 122/76, height 5' 4\" (1.626 m), weight 109 lb 3.2 oz (49.5 kg), last menstrual period 2022, not currently breastfeeding. Estimated body mass index is 18.74 kg/m² as calculated from the following:    Height as of this encounter: 5' 4\" (1.626 m). Weight as of this encounter: 109 lb 3.2 oz (49.5 kg). Labs:    No results found for this visit on 22. No data recorded    NURSE: Adrian JACOB    HPI: here for annual gyn exam, denies c/o; desires continuation of cycle control, satisfied with current OCP    Review of Systems   Constitutional: Negative. HENT: Negative for congestion. Respiratory: Negative for shortness of breath. Cardiovascular: Negative for chest pain. Gastrointestinal: Negative for abdominal pain. Genitourinary: Negative for dysuria. Musculoskeletal: Negative for back pain. Skin: Negative for rash. Neurological: Negative for headaches. Psychiatric/Behavioral: The patient is not nervous/anxious.           Objective  Lymphatic:   no lymphadenopathy  Heent:   negative   Cor: regular rate and rhythm, no murmurs              Pul:clear to auscultation bilaterally- no wheezes, rales or rhonchi, normal air movement, no respiratory distress      GI: Abdomen soft, non-tender. BS normal. No masses,  No organomegaly           Extremities: normal strength, tone, and muscle mass   Breasts: declined     Pelvic Exam: GENITAL/URINARY:  External Genitalia:  General appearance; normal, Hair distribution; normal, Lesions absent  Urethral Meatus:  Size normal, Location normal, Lesions absent, Prolapse absent  Urethra: Fullness absent, Masses absent  Bladder:  Fullness absent, Masses absent, Tenderness absent, Cystocele absent  Vagina:  General appearance normal, Estrogen effect normal, Discharge absent, Lesions absent, Pelvic support normal  Cervix:  General appearance normal, Lesions absent, Discharge absent, Tenderness absent, Enlargement absent, Nodularity absent  Uterus:  Size normal, Tenderness absent  Adenexa: Masses absent, Tenderness absent  Anus/Perineum:  Lesions absent and Masses absent                                                  Assessment and Plan          Diagnosis Orders   1. Encounter for annual routine gynecological examination     2. Irregular menses  Norethin Ace-Eth Estrad-FE 1-20 MG-MCG(24) TABS             I am having Rekha Veras start on Norethin Ace-Eth Estrad-FE. I am also having her maintain her [id number]-[id number] PO, ibuprofen, and Aleja 24 FE. Return in about 1 year (around 4/27/2023) for yearly. She was also counseled on her preventative health maintenance recommendations and follow-up. There are no Patient Instructions on file for this visit.     YONI Shea CNM,4/30/2022 12:48 PM

## 2022-04-30 ASSESSMENT — ENCOUNTER SYMPTOMS
ABDOMINAL PAIN: 0
SHORTNESS OF BREATH: 0
BACK PAIN: 0

## 2022-10-03 ENCOUNTER — HOSPITAL ENCOUNTER (OUTPATIENT)
Age: 27
Discharge: HOME | End: 2022-10-03

## 2022-10-03 LAB
ABSOLUTE EOS #: 0.07 K/UL (ref 0–0.44)
ABSOLUTE IMMATURE GRANULOCYTE: <0.03 K/UL (ref 0–0.3)
ABSOLUTE LYMPH #: 1.62 K/UL (ref 1.1–3.7)
ABSOLUTE MONO #: 0.56 K/UL (ref 0.1–1.2)
ALBUMIN SERPL-MCNC: 4.4 G/DL (ref 3.5–5.2)
ALBUMIN/GLOBULIN RATIO: 1.6 (ref 1–2.5)
ALP BLD-CCNC: 56 U/L (ref 35–104)
ALT SERPL-CCNC: 10 U/L (ref 5–33)
ANION GAP SERPL CALCULATED.3IONS-SCNC: 12 MMOL/L (ref 9–17)
AST SERPL-CCNC: 14 U/L
BASOPHILS # BLD: 1 % (ref 0–2)
BASOPHILS ABSOLUTE: 0.08 K/UL (ref 0–0.2)
BILIRUB SERPL-MCNC: 0.7 MG/DL (ref 0.3–1.2)
BUN BLDV-MCNC: 14 MG/DL (ref 6–20)
BUN/CREAT BLD: 15 (ref 9–20)
CALCIUM SERPL-MCNC: 10 MG/DL (ref 8.6–10.4)
CHLORIDE BLD-SCNC: 103 MMOL/L (ref 98–107)
CHOLESTEROL/HDL RATIO: 2.3
CHOLESTEROL: 185 MG/DL
CO2: 26 MMOL/L (ref 20–31)
CREAT SERPL-MCNC: 0.94 MG/DL (ref 0.5–0.9)
EKG ATRIAL RATE: 66 BPM
EKG P AXIS: 52 DEGREES
EKG P-R INTERVAL: 154 MS
EKG Q-T INTERVAL: 392 MS
EKG QRS DURATION: 82 MS
EKG QTC CALCULATION (BAZETT): 410 MS
EKG R AXIS: 72 DEGREES
EKG T AXIS: 36 DEGREES
EKG VENTRICULAR RATE: 66 BPM
EOSINOPHILS RELATIVE PERCENT: 1 % (ref 1–4)
GFR AFRICAN AMERICAN: >60 ML/MIN
GFR NON-AFRICAN AMERICAN: >60 ML/MIN
GFR SERPL CREATININE-BSD FRML MDRD: ABNORMAL ML/MIN/{1.73_M2}
GFR SERPL CREATININE-BSD FRML MDRD: ABNORMAL ML/MIN/{1.73_M2}
GLUCOSE BLD-MCNC: 87 MG/DL (ref 70–99)
HCT VFR BLD CALC: 47.3 % (ref 36.3–47.1)
HDLC SERPL-MCNC: 81 MG/DL
HEMOGLOBIN: 15.7 G/DL (ref 11.9–15.1)
IMMATURE GRANULOCYTES: 0 %
LDL CHOLESTEROL: 93 MG/DL (ref 0–130)
LYMPHOCYTES # BLD: 25 % (ref 24–43)
MCH RBC QN AUTO: 30 PG (ref 25.2–33.5)
MCHC RBC AUTO-ENTMCNC: 33.2 G/DL (ref 28.4–34.8)
MCV RBC AUTO: 90.3 FL (ref 82.6–102.9)
MONOCYTES # BLD: 9 % (ref 3–12)
NRBC AUTOMATED: 0 PER 100 WBC
PDW BLD-RTO: 12.1 % (ref 11.8–14.4)
PLATELET # BLD: 293 K/UL (ref 138–453)
PMV BLD AUTO: 10 FL (ref 8.1–13.5)
POTASSIUM SERPL-SCNC: 3.4 MMOL/L (ref 3.7–5.3)
RBC # BLD: 5.24 M/UL (ref 3.95–5.11)
SEG NEUTROPHILS: 64 % (ref 36–65)
SEGMENTED NEUTROPHILS ABSOLUTE COUNT: 4.26 K/UL (ref 1.5–8.1)
SODIUM BLD-SCNC: 141 MMOL/L (ref 135–144)
T3 TOTAL: 146 NG/DL (ref 60–181)
TOTAL PROTEIN: 7.1 G/DL (ref 6.4–8.3)
TRIGL SERPL-MCNC: 53 MG/DL
TSH SERPL DL<=0.05 MIU/L-ACNC: 1.38 UIU/ML (ref 0.3–5)
WBC # BLD: 6.6 K/UL (ref 3.5–11.3)

## 2022-10-03 PROCEDURE — 84480 ASSAY TRIIODOTHYRONINE (T3): CPT

## 2022-10-03 PROCEDURE — 80053 COMPREHEN METABOLIC PANEL: CPT

## 2022-10-03 PROCEDURE — 84443 ASSAY THYROID STIM HORMONE: CPT

## 2022-10-03 PROCEDURE — 84436 ASSAY OF TOTAL THYROXINE: CPT

## 2022-10-03 PROCEDURE — 93005 ELECTROCARDIOGRAM TRACING: CPT | Performed by: NURSE PRACTITIONER

## 2022-10-03 PROCEDURE — 36415 COLL VENOUS BLD VENIPUNCTURE: CPT

## 2022-10-03 PROCEDURE — 80061 LIPID PANEL: CPT

## 2022-10-03 PROCEDURE — 85025 COMPLETE CBC W/AUTO DIFF WBC: CPT

## 2022-10-03 PROCEDURE — 93010 ELECTROCARDIOGRAM REPORT: CPT | Performed by: INTERNAL MEDICINE

## 2022-10-04 LAB — T4 TOTAL: 9 UG/DL (ref 4.5–10.9)

## 2022-10-07 ENCOUNTER — HOSPITAL ENCOUNTER (OUTPATIENT)
Dept: NON INVASIVE DIAGNOSTICS | Age: 27
Discharge: HOME | End: 2022-10-07

## 2022-10-07 PROCEDURE — 93660 TILT TABLE EVALUATION: CPT

## 2022-10-07 NOTE — PROCEDURES
[facility], [address] [place] [address]                                TILT TABLE TEST    PATIENT NAME: Surendra Myers                      :        1995  MED REC NO:   [id number]                              ROOM:  ACCOUNT NO:   [de-identified]                           ADMIT DATE: 10/07/2022  PROVIDER:     Swati Gallegos MD    Cardiovascular Diagnostics Department    DATE OF PROCEDURE:  10/07/2022    ORDERING PROVIDER:  YONI Marie-CNP    PRIMARY CARE PROVIDER:  Susan Lutz MD    INTERPRETING PHYSICIAN:  Swati Gallegos MD     Diagnosis:  Dizziness, elevated blood pressure without diagnosis of  hypertension. PROCEDURE SUMMARY:  After explaining the risk, benefits and alternatives  to the procedure, informed written consent was obtained. The patient  was brought to the tilt table laboratory in a fasting and resting state. The patient was placed on the tilt table in the supine position, ECG  patches were applied and an IV was placed. The patient's baseline blood  pressure was 149/91 mmHg with a heart rate of 80/minute. The patient  was then raised to the 70 degree head upright tilt position with and  pulse rate, blood pressure and cardiac rhythm were monitored and  recorded each minute of the study for a maximum of 30 minutes. During the initial 20 minutes of the study, the patient's blood pressure  ranged from a high of 154/100 mmHg to a low of 76/60 mmHg, while their  heart rate ranged from a low of 99/minute to a high of 124/minute. During this period, the patient reported severe lightheadedness, vision  changes, feeling as if they were going to pass out. At this point, the patient was returned to the supine position and  monitored for an additional ten minutes.   Once the patient felt well  enough to be discharged home, they were discharged home with  instructions to follow up with their primary care physician and/or  cardiologist as previously scheduled. STUDY CONCLUSIONS:  1.  Markedly abnormal tilt table test, initial /91 with heart rate  of 80 bpm.    2.  BP dropped to 76/60 mmHg with near syncope only after 5 minutes. Heart rate was 99 bpm.    3.  Findings consistent with neurocardiogenic pressure. 4.  Therapy with ProAmatine, Florinef and Serotonin Selective Reuptake  Inhibitor (SSRI) along with adequate hydration and other measures can be  helpful. ELISA Denis MD    D: 10/07/2022 15:35:04       T: 10/07/2022 15:37:50     DEQUAN/ARUNA_CHASITYIT  Job#: [id number]     Doc#: Unknown    CC:  Megan RHODES  [address] [place] [address] No bruits; no thyromegaly or nodules

## 2022-12-08 ENCOUNTER — HOSPITAL ENCOUNTER (OUTPATIENT)
Dept: NON INVASIVE DIAGNOSTICS | Age: 27
Discharge: HOME | End: 2022-12-08

## 2022-12-08 ENCOUNTER — OFFICE VISIT (OUTPATIENT)
Dept: CARDIOLOGY | Age: 27
End: 2022-12-08

## 2022-12-08 VITALS
WEIGHT: 104 LBS | OXYGEN SATURATION: 99 % | BODY MASS INDEX: 17.75 KG/M2 | SYSTOLIC BLOOD PRESSURE: 127 MMHG | HEIGHT: 64 IN | RESPIRATION RATE: 18 BRPM | HEART RATE: 95 BPM | DIASTOLIC BLOOD PRESSURE: 86 MMHG

## 2022-12-08 DIAGNOSIS — Z71.6 TOBACCO ABUSE COUNSELING: ICD-10-CM

## 2022-12-08 DIAGNOSIS — R94.09 ABNORMAL TILT TABLE TEST: ICD-10-CM

## 2022-12-08 DIAGNOSIS — R07.9 CHEST PAIN, UNSPECIFIED TYPE: ICD-10-CM

## 2022-12-08 DIAGNOSIS — R61 HYPERHIDROSIS: ICD-10-CM

## 2022-12-08 DIAGNOSIS — U07.1 COVID-19: ICD-10-CM

## 2022-12-08 DIAGNOSIS — R00.2 PALPITATION: ICD-10-CM

## 2022-12-08 DIAGNOSIS — G90.A POTS (POSTURAL ORTHOSTATIC TACHYCARDIA SYNDROME): ICD-10-CM

## 2022-12-08 DIAGNOSIS — R42 LIGHTHEADED: ICD-10-CM

## 2022-12-08 DIAGNOSIS — G90.A POTS (POSTURAL ORTHOSTATIC TACHYCARDIA SYNDROME): Primary | ICD-10-CM

## 2022-12-08 DIAGNOSIS — R42 DIZZINESS: ICD-10-CM

## 2022-12-08 PROCEDURE — 99204 OFFICE O/P NEW MOD 45 MIN: CPT | Performed by: INTERNAL MEDICINE

## 2022-12-08 PROCEDURE — 93243 EXT ECG>48HR<7D SCAN A/R: CPT

## 2022-12-08 PROCEDURE — 93242 EXT ECG>48HR<7D RECORDING: CPT

## 2022-12-08 NOTE — PROGRESS NOTES
Ari Dozier am scribing for and in the presence of Terrence Vieyra MD, F.A.C.C..    Patient: Teena Grover  : 1995  Date of Visit: 2022    REASON FOR VISIT / CONSULTATION: Establish Cardiologist (HX: gestational HTN Pt is here to est care abn Tilt. She has dizziness/lightheadedness, headaches, nausea, BP drops and is elevated at times. She noticed it worsening about 4 months ago. CP, daily, tight, lasts minutes, does not travel, very active happens with and without exertion. Palp Denies: SOB, )      History of Present Illness:        Dear Claudeen Castellani, MD    I had the pleasure of seeing  Teena Grover in my office today. Ms. Sue Agarwal is a 24 y.o. female with a recent history of intermittent lightheadedness and dizziness. She does Vape cigarette. Denies any family history of heart disease. Tilt table test 10/7/2022- Markedly abnormal tilt table test, initial /91 with heart rate of 80 bpm. BP dropped to 76/60 mmHg with near syncope only after 5 minutes. Heart rate was 99 bpm. Findings consistent with neurocardiogenic dysfunction. Ms. Sue Agarwal is here to establish care today for worsening lightheaded/dizziness, palpitations, and chest pain that started a few months ago. She has a 3year old and 10year old. She did have high blood pressure with second pregnancy but no preeclampsia or eclampsia. She does have bad dizziness denies ever passing out. She is having chest pains more often, they come and go, feels tight and happen random. She is very active daily. She drinks alotof water, she quit drinking energy drinks, or caffeine besides coffee. No weight changes, she eats and drinks well. Denies any problems sleeping problems. She did have COVID 2022. She has felt nausea and vomited when dizziness. Her palpitations are more often. She was started on Zoloft 25 and helped for a week and increased it to 50 mg and now feels her symptoms are worse.  Denies any pain in legs or thighs when walking. She has been getting migraine headaches. She reports having a relatively good exercise tolerance and denied any current or recent chest pain, shortness of breath, abdominal pain, bleeding problems, problems with her medications or any other concerns at this time. PAST MEDICAL HISTORY:         Past Medical History:   Diagnosis Date    TN 2020       CURRENT ALLERGIES: Other and Tetanus toxoids REVIEW OF SYSTEMS: 14 systems were reviewed. Pertinent positives and negatives as above, all else negative. Past Surgical History:   Procedure Laterality Date     SECTION      Social History:  Social History     Tobacco Use    Smoking status: Never    Smokeless tobacco: Never   Vaping Use    Vaping Use: Every day    Substances: Nicotine    Devices: Disposable   Substance Use Topics    Alcohol use: No    Drug use: No        CURRENT MEDICATIONS:        Outpatient Medications Marked as Taking for the 22 encounter (Office Visit) with All Encinas MD   Medication Sig Dispense Refill    sertraline (ZOLOFT) 50 MG tablet Take 50 mg by mouth daily      Norethin Ace-Eth Estrad-FE 1-20 MG-MCG(24) TABS Take 1 tablet by mouth daily 28 tablet 12       FAMILY HISTORY: family history includes Cancer in her paternal grandfather; Diabetes in her maternal grandfather; High Blood Pressure in her maternal grandmother and paternal grandmother; High Cholesterol in her maternal grandmother; Hypertension in her brother; Stroke in her maternal grandfather. Physical Examination:     BP (!) 142/103 (Site: Left Upper Arm, Position: Standing, Cuff Size: Medium Adult)   Pulse 95   Resp 18   Ht 5' 4\" (1.626 m)   Wt 104 lb (47.2 kg)   SpO2 99%   BMI 17.85 kg/m²  Body mass index is 17.85 kg/m². Constitutional: She appeared oriented to person and place. She appears well-developed and well-nourished. In no acute distress. HEENT: Normocephalic and atraumatic. No JVD present.  Carotid bruit is not present. No mass and no thyromegaly present. No lymphadenopathy noted. Cardiovascular: Tachycardic rate, regular rhythm, normal heart sounds. Exam reveals no gallop and no friction rubs. No murmur was heard. Pulmonary/Chest: Effort normal and breath sounds normal. No respiratory distress. She has no wheezes, rhonchi or rales. Abdominal: Soft, non-tender. She exhibits no organomegaly, mass or bruit. Extremities: None. No cyanosis or clubbing. 2+ radial and carotid pulses. Distal extremity pulses: 2+ bilaterally. Neurological: Alertness and orientation as per Constitutional exam. No evidence of gross cranial nerve deficit. Coordination appeared normal.   Skin: Skin is warm and dry. There is no rash or diaphoresis. Psychiatric: She has a normal mood and affect. Her speech is normal and behavior is normal.      MOST RECENT LABS ON RECORD:   Lab Results   Component Value Date    WBC 6.6 10/03/2022    HGB 15.7 (H) 10/03/2022    HCT 47.3 (H) 10/03/2022     10/03/2022    CHOL 185 10/03/2022    TRIG 53 10/03/2022    HDL 81 10/03/2022    ALT 10 10/03/2022    AST 14 10/03/2022     10/03/2022    K 3.4 (L) 10/03/2022     10/03/2022    CREATININE 0.94 (H) 10/03/2022    BUN 14 10/03/2022    CO2 26 10/03/2022    TSH 1.38 10/03/2022    GLUF 94 05/06/2020       ASSESSMENT:     1. POTS (postural orthostatic tachycardia syndrome)    2. Lightheaded    3. Dizziness    4. Palpitation    5. Chest pain, unspecified type    6. Tobacco abuse counseling    7. Abnormal tilt table test    8. Hyperhidrosis    9. COVID-19       PLAN:        Postural Orthostatic Tachycardia Syndrome (POTS):  Abnormal Tilt Table: Markedly abnormal And outlined in HPI. I had a very long discussion with the patient regarding possible etiology of her symptoms. Family history of dizziness in her siblings. Also family history of postural orthostatic tachycardia syndrome (POTS)  She vapes. She is not a smoker.   No family history of premature coronary artery disease. No diabetes, hypertension or dyslipidemia. Thyroid function is normal.  She is cutting back on her caffeinated beverages except for her morning coffee. Pharmacologic Therapy: We agreed to decrease Zoloft 25 mg daily. Nonpharmacologic counseling: Because of her condition, I reminded her to try and keep herself well-hydrated and to take extra time when moving from laying to sitting, sitting to standing and standing to walking. I also explained to her to help improve her symptoms she should include 3 g sodium diet, 1 or 2 L of sports drinks daily, knee-high compressions stockings. I told her there is overlap between vasovagal, POTS and dysautonomia can sometimes be get a mixed picture. We must get more accurate diagnosis to guide our therapy. She has resting hypertension recently so I am not going to start her on Florinef or midodrine. Her heart rate is all over the chart. I told her getting a Cam monitor to see if she will benefit from heart rate slowing agents as needed or not. I ordered a CAM for 1 week. It is also essential to make sure that her heart is structurally normal, I ordered an echo to be done. Atypical Chest Pain   Additional Testing:  Treadmill stress test, echo and CAM monitor for one week. Counseling: I advised Ms. Veras to call our office or go to the emergency room if she develops worsening or persistent chest pain or shortness of breath as this could be life threatening. This is also needed to assess heart rate and blood pressure response to exercise and to rule out exercise-induced arrhythmia. Recurrent intermittent palpitations: Rate Control Symptomatic  Additional Testing List: I ordered a echocardiogram to better assess for the etiology of this problem and to help guide future management, I ordered a treadmill stress test WITHOUT imaging to try and rule out this possibility. , and I ordered an CAM MONITOR one week to try and pinpoint the etiology of their symptoms    Tobacco Abuse Counseling: I spent several minutes discussing the dangers of tobacco abuse as well as multiple methods for trying to quit smoking. In the end, Ms. Nidia Best said that she did not want any assistance at this time but would continue to try and quit reduce and eventually quit smoking in the near future. Finally, I recommended that she continue her other medications and follow up with you as previously scheduled. FOLLOW UP:   I told Ms. Veras to call my office if she had any problems, but otherwise I asked her to Return in about 2 weeks (around 12/22/2022). However, I would be happy to see her sooner should the need arise. Sincerely,  Angelica Mason MD, F.A.C.C. [address] [place] [address] Cardiology Specialist    [address] [place] [address]  Phone: [phone], Fax: [phone]     I believe that the risk of significant morbidity and mortality related to the patient's current medical conditions are: intermediate-high. Approximately 45 minutes were spent during prep work, discussion and exam of the patient, and follow up documentation and all of their questions were answered. The documentation recorded by the scribe, accurately and completely reflects the services I personally performed and the decisions made by me. Angelica Mason MD, F.A.C.C.  December 8, 2022

## 2022-12-15 ENCOUNTER — HOSPITAL ENCOUNTER (OUTPATIENT)
Dept: NON INVASIVE DIAGNOSTICS | Age: 27
Discharge: HOME | End: 2022-12-15

## 2022-12-15 DIAGNOSIS — R61 HYPERHIDROSIS: ICD-10-CM

## 2022-12-15 DIAGNOSIS — R07.9 CHEST PAIN, UNSPECIFIED TYPE: ICD-10-CM

## 2022-12-15 DIAGNOSIS — R00.2 PALPITATION: ICD-10-CM

## 2022-12-15 DIAGNOSIS — Z71.6 TOBACCO ABUSE COUNSELING: ICD-10-CM

## 2022-12-15 DIAGNOSIS — R42 DIZZINESS: ICD-10-CM

## 2022-12-15 DIAGNOSIS — R94.09 ABNORMAL TILT TABLE TEST: ICD-10-CM

## 2022-12-15 DIAGNOSIS — R42 LIGHTHEADED: ICD-10-CM

## 2022-12-15 DIAGNOSIS — G90.A POTS (POSTURAL ORTHOSTATIC TACHYCARDIA SYNDROME): ICD-10-CM

## 2022-12-15 PROCEDURE — 93017 CV STRESS TEST TRACING ONLY: CPT

## 2022-12-15 PROCEDURE — 93306 TTE W/DOPPLER COMPLETE: CPT

## 2022-12-16 NOTE — PROCEDURES
[address] [place], [place] [address]                              CARDIAC STRESS TEST    PATIENT NAME: Regino Arroyo                      :        1995  MED REC NO:   [id number]                              ROOM:  ACCOUNT NO:   [de-identified]                           ADMIT DATE: 12/15/2022  PROVIDER:     David Borges MD    CARDIOVASCULAR DIAGNOSTIC DEPARTMENT    DATE OF STUDY:  12/15/2022    ORDERING PROVIDER:  Fadi Mata MD    PRIMARY CARE PROVIDER:  Levy Cosme MD    INTERPRETING PHYSICIAN:  David Borges MD    EXERCISE STRESS TEST REPORT    Stress, exercise stress. INDICATIONS:  Assessment of recent chest pain and/or chest discomfort. CLINICAL HISTORY:  The patient is a 82-year-old woman with no known  coronary artery disease. Previous cardiac history:  None. Other previous history includes chest pain, syncope, lightheadedness,  caffeine. Symptoms just prior to testing:  None. Relevant medications:  None. PROCEDURE:  The patient performed treadmill exercise using a Aurelio  protocol, completing 11:38 minutes and completing an estimated workload  of 00.27 metabolic equivalents (METS). The test was terminated due to fatigue and shortness of breath. The heart rate was 82 beats per minute at baseline and increased to 179  beats at peak exercise, which was 91% of the maximum predicted heart  rate. The rest blood pressure was 118/60 mm/Hg and increased to 164/88  mm/Hg, which is a normal response. During the procedure, the patient  developed fatigue, shortness of breath and leg fatigue, but denied chest  discomfort. STRESS ECG RESULTS:  The resting electrocardiogram demonstrated normal  sinus rhythm without definitive ST-segment abnormalities suggestive of  myocardial ischemia.     At peak exercise and during recovery, the patient developed:    No significant ST segment changes suggestive of myocardial ischemia with  no premature atrial contractions (PACs) and no premature ventricular  contractions (PVCs). IMPRESSION:  No significant electrocardiographic evidence of myocardial ischemia  during EKG monitoring without significant associated arrhythmias. The patient's Duke Treadmill score is 10 which correlates with a low  risk significant coronary artery disease. Overall these results are most consistent with a low risk for  significant coronary artery disease.         Karina Crawford MD    D: 12/16/2022 8:55:15       T: 12/16/2022 8:56:23     BAY/ARUNA_EDIT  Job#: [id number]     Doc#: Unknown    CC:  MD Di Palumbo MD

## 2022-12-19 ENCOUNTER — TELEPHONE (OUTPATIENT)
Dept: CARDIOLOGY | Age: 27
End: 2022-12-19

## 2022-12-19 NOTE — TELEPHONE ENCOUNTER
----- Message from Roberto Obregon MD sent at 12/18/2022  5:51 PM EST -----  Echo is good.   Heart function is normal.  Thank you

## 2022-12-22 ENCOUNTER — OFFICE VISIT (OUTPATIENT)
Dept: CARDIOLOGY | Age: 27
End: 2022-12-22

## 2022-12-22 VITALS
DIASTOLIC BLOOD PRESSURE: 82 MMHG | RESPIRATION RATE: 18 BRPM | SYSTOLIC BLOOD PRESSURE: 124 MMHG | BODY MASS INDEX: 18.82 KG/M2 | HEIGHT: 64 IN | HEART RATE: 78 BPM | WEIGHT: 110.2 LBS | OXYGEN SATURATION: 100 %

## 2022-12-22 DIAGNOSIS — R42 LIGHTHEADED: ICD-10-CM

## 2022-12-22 DIAGNOSIS — R07.9 CHEST PAIN, UNSPECIFIED TYPE: ICD-10-CM

## 2022-12-22 DIAGNOSIS — R94.09 ABNORMAL TILT TABLE TEST: ICD-10-CM

## 2022-12-22 DIAGNOSIS — G90.A POTS (POSTURAL ORTHOSTATIC TACHYCARDIA SYNDROME): Primary | ICD-10-CM

## 2022-12-22 DIAGNOSIS — R00.2 PALPITATION: ICD-10-CM

## 2022-12-22 DIAGNOSIS — Z71.6 TOBACCO ABUSE COUNSELING: ICD-10-CM

## 2022-12-22 PROCEDURE — 99214 OFFICE O/P EST MOD 30 MIN: CPT | Performed by: INTERNAL MEDICINE

## 2022-12-22 PROCEDURE — 99211 OFF/OP EST MAY X REQ PHY/QHP: CPT | Performed by: INTERNAL MEDICINE

## 2022-12-22 RX ORDER — MIDODRINE HYDROCHLORIDE 5 MG/1
5 TABLET ORAL 2 TIMES DAILY
Qty: 90 TABLET | Refills: 3 | Status: SHIPPED | OUTPATIENT
Start: 2022-12-22

## 2022-12-22 NOTE — PROGRESS NOTES
Pranav Benavides am scribing for and in the presence of Ahmet Horan MD, F.A.C.C..    Patient: Lucy Jenkins  : 1995  Date of Visit: 2022    REASON FOR VISIT / CONSULTATION: Follow-up (HX: POTS, light headed, dizziness, palps, CP, tobacco abuse, abn tilt, Covid, hyperhidrosis. Pt is here for a 2 week follow up. Pt says she is feeling about the same as before. Still having a lot of light headed/dizziness, having CP daily, feels like pressure lasts minutes. Palpitations daily. SOB at times. )      History of Present Illness:        Dear Puma Paul MD    I had the pleasure of seeing  Lucy Jenkins in my office today. Ms. Bahman De Oliveira is a 24 y.o. female with a recent history of intermittent lightheadedness and dizziness. She does Vape cigarette. Denies any family history of heart disease. Tilt table test 10/7/2022- Markedly abnormal tilt table test, initial /91 with heart rate of 80 bpm. BP dropped to 76/60 mmHg with near syncope only after 5 minutes. Heart rate was 99 bpm. Findings consistent with neurocardiogenic dysfunction. Stress test done on 22: No significant electrocardiographic evidence of myocardial ischemia during EKG monitoring without significant associated arrhythmias. The patient's Duke Treadmill score is 10 which correlates with a low risk significant coronary artery disease. Overall these results are most consistent with a low risk for  significant coronary artery disease. Echo done on 12/15/22: Left atrium is normal in size. Left Ventricle Global left ventricular systolic function appears preserved with an estimated ejection fraction of 60%. The left ventricular cavity size is within normal limits and the left ventricular wall thickness is within normal limits. No definite specific wall motion abnormalities were identified. Right atrium is normal in size. Normal right ventricular size and function. Normal mitral valve structure and function. Normal aortic valve structure and function without stenosis or regurgitation. Normal tricuspid valve structure with trivial tricuspid regurgitation. The pulmonic valve is normal in structure. No significant pericardial effusion is seen. Ms. Navarro Elliott is here today for a two week follow up. She says she feels about the same since her last visit. She still is having a lot of light headed/dizziness. She is still having chest pain daily, she says it feels like pressure and lasts minutes. She still is having palpitations daily. She has shortness of breath at times. She is eating and drinking well. She is very active daily. She drinks a lot of water and does not drink any energy drinks. She sleeps well at night. Denies blood in her urine or stool. Denies fever, chills or cough. She reports having a relatively good exercise tolerance and denied any current or recent  abdominal pain, bleeding problems, problems with her medications or any other concerns at this time. PAST MEDICAL HISTORY:         Past Medical History:   Diagnosis Date    [facility] 2020       CURRENT ALLERGIES: Other and Tetanus toxoids REVIEW OF SYSTEMS: 14 systems were reviewed. Pertinent positives and negatives as above, all else negative.      Past Surgical History:   Procedure Laterality Date     SECTION      Social History:  Social History     Tobacco Use    Smoking status: Never    Smokeless tobacco: Never   Vaping Use    Vaping Use: Every day    Substances: Nicotine    Devices: Disposable   Substance Use Topics    Alcohol use: No    Drug use: No        CURRENT MEDICATIONS:        Outpatient Medications Marked as Taking for the 22 encounter (Office Visit) with Jd Branham MD   Medication Sig Dispense Refill    sertraline (ZOLOFT) 50 MG tablet Take 50 mg by mouth daily      Norethin Ace-Eth Estrad-FE 1-20 MG-MCG(24) TABS Take 1 tablet by mouth daily 28 tablet 12       FAMILY HISTORY: family history includes Cancer in her paternal grandfather; Diabetes in her maternal grandfather; High Blood Pressure in her maternal grandmother and paternal grandmother; High Cholesterol in her maternal grandmother; Hypertension in her brother; Stroke in her maternal grandfather. Physical Examination:     /82 (Site: Left Upper Arm, Position: Sitting, Cuff Size: Medium Adult)   Pulse 78   Resp 18   Ht 5' 4\" (1.626 m)   Wt 110 lb 3.2 oz (50 kg)   SpO2 100%   BMI 18.92 kg/m²  Body mass index is 18.92 kg/m². Constitutional: She appeared oriented to person and place. She appears well-developed and well-nourished. In no acute distress. HEENT: Normocephalic and atraumatic. No JVD present. Carotid bruit is not present. No mass and no thyromegaly present. No lymphadenopathy noted. Cardiovascular: Tachycardic rate, regular rhythm, normal heart sounds. Exam reveals no gallop and no friction rubs. No murmur was heard. Pulmonary/Chest: Effort normal and breath sounds normal. No respiratory distress. She has no wheezes, rhonchi or rales. Abdominal: Soft, non-tender. She exhibits no organomegaly, mass or bruit. Extremities: None. No cyanosis or clubbing. 2+ radial and carotid pulses. Distal extremity pulses: 2+ bilaterally. Neurological: Alertness and orientation as per Constitutional exam. No evidence of gross cranial nerve deficit. Coordination appeared normal.   Skin: Skin is warm and dry. There is no rash or diaphoresis. Psychiatric: She has a normal mood and affect.  Her speech is normal and behavior is normal.      MOST RECENT LABS ON RECORD:   Lab Results   Component Value Date    WBC 6.6 10/03/2022    HGB 15.7 (H) 10/03/2022    HCT 47.3 (H) 10/03/2022     10/03/2022    CHOL 185 10/03/2022    TRIG 53 10/03/2022    HDL 81 10/03/2022    ALT 10 10/03/2022    AST 14 10/03/2022     10/03/2022    K 3.4 (L) 10/03/2022     10/03/2022    CREATININE 0.94 (H) 10/03/2022    BUN 14 10/03/2022    CO2 26 10/03/2022    TSH 1.38 10/03/2022    GLUF 94 05/06/2020       ASSESSMENT:     1. POTS (postural orthostatic tachycardia syndrome)    2. Lightheaded    3. Chest pain, unspecified type    4. Palpitation    5. Tobacco abuse counseling    6. Abnormal tilt table test       PLAN:        Postural Orthostatic Tachycardia Syndrome (POTS): she is having a lot of light headed/dizziness and palpitations daily. Abnormal Tilt Table: Markedly abnormal And outlined in HPI. I had a very long discussion with the patient regarding possible etiology of her symptoms. Family history of dizziness in her siblings. Also family history of postural orthostatic tachycardia syndrome (POTS)  She vapes. She is not a smoker. No family history of premature coronary artery disease. No diabetes, hypertension or dyslipidemia. Thyroid function is normal.  She is cutting back on her caffeinated beverages except for her morning coffee. Pharmacologic Therapy: Continue Zoloft 50 mg daily. Nonpharmacologic counseling: Because of her condition, I reminded her to try and keep herself well-hydrated and to take extra time when moving from laying to sitting, sitting to standing and standing to walking. I also explained to her to help improve her symptoms she should include 3 g sodium diet, 1 or 2 L of sports drinks daily, knee-high compressions stockings. START midodrine 5 mg bid. START Verapamil 180 mg daily. Atypical Chest Pain: She is having daily chest pain that feels like pressure and lasts a few minutes. Echo and plain treadmill exercise test are unremarkable. No further cardiac work-up indicated. General  Counseling: I advised Ms. Veras to call our office or go to the emergency room if she develops worsening or persistent chest pain or shortness of breath as this could be life threatening. This is also needed to assess heart rate and blood pressure response to exercise and to rule out exercise-induced arrhythmia.      Recurrent intermittent palpitations: Rate Control Symptomatic  Additional Testing List: None  START verapamil 180 mg daily. Tobacco Abuse Counseling: I spent several minutes discussing the dangers of tobacco abuse as well as multiple methods for trying to quit smoking. In the end, Ms. Kamala Sutton said that she did not want any assistance at this time but would continue to try and quit reduce and eventually quit smoking in the near future. Finally, I recommended that she continue her other medications and follow up with you as previously scheduled. FOLLOW UP:   I told Ms. Veras to call my office if she had any problems, but otherwise I asked her to Return in about 6 weeks (around 2/2/2023). However, I would be happy to see her sooner should the need arise. Sincerely,  Skylar Grimes MD, F.A.C.C. [facility]    [address], [address] [place] [address]  Phone: [phone], Fax: [phone]     I believe that the risk of significant morbidity and mortality related to the patient's current medical conditions are: Intermediate. > 35 minutes were spent during prep work, discussion and exam of the patient, and follow up documentation and all of their questions were answered. The documentation recorded by the scribe, accurately and completely reflects the services I personally performed and the decisions made by me. Skylar Grimes MD, F.A.C.C.  December 22, 2022

## 2022-12-22 NOTE — PATIENT INSTRUCTIONS
SURVEY:    You may be receiving a survey from [employer] regarding your visit today. Please complete the survey to enable us to provide the highest quality of care to you and your family. If you cannot score us a very good on any question, please call the office to discuss how we could have made your experience a very good one. Thank you.

## 2022-12-30 NOTE — PROCEDURES
[address] [facility], [address] [place] [address]                                 EVENT MONITOR    PATIENT NAME: Jennifer Perez                      :        1995  MED REC NO:   [id number]                              ROOM:  ACCOUNT NO:   [de-identified]                           ADMIT DATE: 2022  PROVIDER:     Luke Dowd MD    CARDIOVASCULAR DIAGNOSTIC DEPARTMENT    DATE OF STUDY:  2022    ORDERING PROVIDER:  Divina Costa. Paty Mtz MD    PRIMARY CARE PROVIDER:  Adriana Lowry MD    INTERPRETING PHYSICIAN: Divina Costa. Paty Mtz MD    DIAGNOSIS:  Dizziness and giddiness. PHYSICIAN INTERPRETATION:  Findings Summary  1. Predominant rhythm: Normal sinus rhythm. 2. Ectopic Atrial Rhythm (EAR). 3. PAC 0.01%. Five days recorded. Baseline rhythm is sinus with average heart rate of  81 bpm, ranging between 46 and 170 bpm. Sinus tachycardia represented  20% of the study duration. Rare isolated premature atrial contractions. No significant ventricular arrhythmia.         Mateus Jenkins MD    D: 2022 12:36:17       T: 2022 12:37:32     DEQUAN/ROLY_LEE ANN  Job#: [id number]     Doc#: Unknown    CC:  Adriana Lowry MD

## 2023-01-06 ENCOUNTER — TELEPHONE (OUTPATIENT)
Dept: CARDIOLOGY | Age: 28
End: 2023-01-06

## 2023-05-01 DIAGNOSIS — N92.6 IRREGULAR MENSES: ICD-10-CM

## 2023-05-01 RX ORDER — NORETHINDRONE ACETATE AND ETHINYL ESTRADIOL AND FERROUS FUMARATE 1MG-20(24)
1 KIT ORAL DAILY
Qty: 28 TABLET | Refills: 3 | Status: SHIPPED | OUTPATIENT
Start: 2023-05-01

## 2023-05-01 NOTE — TELEPHONE ENCOUNTER
Patient called and had to reschedule her appt tomorrow for August. Will need refill on cycle control to get through. Pharmacy verified and order pended for james to send.

## 2023-05-09 ENCOUNTER — HOSPITAL ENCOUNTER (EMERGENCY)
Age: 28
Discharge: LEFT BEFORE DISCHARGE | End: 2023-05-09

## 2023-05-09 VITALS
DIASTOLIC BLOOD PRESSURE: 91 MMHG | OXYGEN SATURATION: 99 % | TEMPERATURE: 97.2 F | RESPIRATION RATE: 14 BRPM | HEART RATE: 83 BPM | SYSTOLIC BLOOD PRESSURE: 136 MMHG

## 2025-03-04 NOTE — TELEPHONE ENCOUNTER
----- Message from Ray Cornoado MD sent at 1/6/2023  8:36 AM EST -----  Heart monitor is good. Continue current therapy and follow-up. Please call with questions and/or concerns.   Thank you Female